# Patient Record
Sex: MALE | Race: BLACK OR AFRICAN AMERICAN | NOT HISPANIC OR LATINO | Employment: UNEMPLOYED | URBAN - METROPOLITAN AREA
[De-identification: names, ages, dates, MRNs, and addresses within clinical notes are randomized per-mention and may not be internally consistent; named-entity substitution may affect disease eponyms.]

---

## 2022-04-07 ENCOUNTER — HISTORICAL (OUTPATIENT)
Dept: ADMINISTRATIVE | Facility: HOSPITAL | Age: 77
End: 2022-04-07
Payer: MEDICAID

## 2022-04-24 VITALS — WEIGHT: 176.38 LBS | HEIGHT: 66 IN | BODY MASS INDEX: 28.34 KG/M2

## 2022-05-19 ENCOUNTER — OFFICE VISIT (OUTPATIENT)
Dept: OPHTHALMOLOGY | Facility: CLINIC | Age: 77
End: 2022-05-19
Payer: MEDICARE

## 2022-05-19 VITALS — BODY MASS INDEX: 28.34 KG/M2 | HEIGHT: 66 IN | WEIGHT: 176.38 LBS

## 2022-05-19 DIAGNOSIS — H25.89 TOTAL, MATURE AGE-RELATED CATARACT: ICD-10-CM

## 2022-05-19 DIAGNOSIS — H25.13 AGE-RELATED NUCLEAR CATARACT OF BOTH EYES: Primary | ICD-10-CM

## 2022-05-19 PROCEDURE — 76512 OPH US DX B-SCAN: CPT | Mod: PBBFAC,PO | Performed by: OPHTHALMOLOGY

## 2022-05-19 PROCEDURE — 99213 OFFICE O/P EST LOW 20 MIN: CPT | Mod: PBBFAC,PO | Performed by: STUDENT IN AN ORGANIZED HEALTH CARE EDUCATION/TRAINING PROGRAM

## 2022-05-19 PROCEDURE — 76512 OPH US DX B-SCAN: CPT | Mod: 50,PBBFAC,PO | Performed by: STUDENT IN AN ORGANIZED HEALTH CARE EDUCATION/TRAINING PROGRAM

## 2022-05-19 RX ORDER — PRAVASTATIN SODIUM 20 MG/1
20 TABLET ORAL DAILY
COMMUNITY
Start: 2022-05-04

## 2022-05-19 RX ORDER — CHLORPHENIRAMIN/PSEUDOEPHED/DM 1-15-5MG/5
17 LIQUID (ML) ORAL DAILY
Status: ON HOLD | COMMUNITY
Start: 2022-04-18 | End: 2023-09-28 | Stop reason: HOSPADM

## 2022-05-19 RX ORDER — CALCIUM CARBONATE 600 MG
1200 TABLET ORAL
COMMUNITY
Start: 2021-08-19

## 2022-05-19 RX ORDER — ONDANSETRON 4 MG/1
4 TABLET, FILM COATED ORAL EVERY 4 HOURS PRN
COMMUNITY
Start: 2022-03-09

## 2022-05-19 RX ORDER — FUROSEMIDE 20 MG/1
20 TABLET ORAL DAILY
COMMUNITY
Start: 2022-05-04

## 2022-05-19 RX ORDER — POLYETHYLENE GLYCOL 3350 17 G/17G
17 POWDER, FOR SOLUTION ORAL
COMMUNITY
Start: 2021-08-19

## 2022-05-19 RX ORDER — CHLORPROMAZINE HYDROCHLORIDE 200 MG/1
200 TABLET, FILM COATED ORAL
COMMUNITY
Start: 2021-08-19 | End: 2023-09-20 | Stop reason: SDUPTHER

## 2022-05-19 RX ORDER — CHLORPROMAZINE HYDROCHLORIDE 200 MG/1
200 TABLET, FILM COATED ORAL NIGHTLY
COMMUNITY
Start: 2022-05-04

## 2022-05-19 RX ORDER — LORAZEPAM 0.5 MG/1
0.5 TABLET ORAL 3 TIMES DAILY PRN
COMMUNITY
Start: 2022-05-07 | End: 2023-09-20 | Stop reason: ALTCHOICE

## 2022-05-19 RX ORDER — PROPRANOLOL HYDROCHLORIDE 10 MG/1
10 TABLET ORAL 2 TIMES DAILY
COMMUNITY
Start: 2022-05-04

## 2022-05-19 NOTE — PROGRESS NOTES
B scan OS (difficult to get full views due to poor cooperation): no RD or masses    Assessment/Plan:  1.) Visually significant cataracts OU  - OS (dense white)>>>>OD  - unable to determine va since patient is CI and nonverbal but able to see through cataract OD  - hx intellectual disability, schizophrenia, anxiety  - pt could not consent for self, [facility] [place] would need to consent for surgery  - B scan OS with flat retina  - Patient definitely with visually significant cataracts, but given his severe cognitive impairment, high threshold for surgery. He would not be able to follow post op instructions and would require around the clock monitoring, especially early on in post op period  - pt seen by PCP but clearance not mentioned in faxed note  - will need consent signed by [facility], pt cannot consent for self  - [facility] rep states that they do not think he could be watched closely enough to prevent rubbing his eye at his facility; pt cannot use drops on his own  - When time for CEIOL consider subtenon's steroid and intracameral antibiotics at the time of surgery. Would also need wounds sutured.  - given functional vision OD at this time, will defer surgery OS for now  - can still perform ADLs    6mo DFE and B scan

## 2022-08-26 ENCOUNTER — HOSPITAL ENCOUNTER (EMERGENCY)
Facility: HOSPITAL | Age: 77
Discharge: HOME | End: 2022-08-26
Attending: STUDENT IN AN ORGANIZED HEALTH CARE EDUCATION/TRAINING PROGRAM
Payer: MEDICARE

## 2022-08-26 VITALS
TEMPERATURE: 98 F | RESPIRATION RATE: 15 BRPM | DIASTOLIC BLOOD PRESSURE: 85 MMHG | OXYGEN SATURATION: 99 % | HEART RATE: 89 BPM | WEIGHT: 213.88 LBS | SYSTOLIC BLOOD PRESSURE: 130 MMHG

## 2022-08-26 DIAGNOSIS — U07.1 COVID-19: ICD-10-CM

## 2022-08-26 DIAGNOSIS — T14.90XA TRAUMA: ICD-10-CM
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
## 2022-08-26 LAB
ABS NEUT (OLG): 5.39 X10(3)/MCL (ref 2.1–9.2)
ALBUMIN SERPL-MCNC: 4 GM/DL (ref 3.4–4.8)
ALBUMIN/GLOB SERPL: 1.5 RATIO (ref 1.1–2)
ALP SERPL-CCNC: 49 UNIT/L (ref 40–150)
ALT SERPL-CCNC: 15 UNIT/L (ref 0–55)
APTT PPP: 30.1 SECONDS (ref 23.2–33.7)
AST SERPL-CCNC: 19 UNIT/L (ref 5–34)
BILIRUBIN DIRECT+TOT PNL SERPL-MCNC: 0.6 MG/DL
BNP BLD-MCNC: <10 PG/ML
BUN SERPL-MCNC: 23.1 MG/DL (ref 8.4–25.7)
CALCIUM SERPL-MCNC: 9 MG/DL (ref 8.8–10)
CHLORIDE SERPL-SCNC: 104 MMOL/L (ref 98–107)
CO2 SERPL-SCNC: 23 MMOL/L (ref 23–31)
CREAT SERPL-MCNC: 1.14 MG/DL (ref 0.73–1.18)
ERYTHROCYTE [DISTWIDTH] IN BLOOD BY AUTOMATED COUNT: 12.3 % (ref 11.5–17)
ETHANOL SERPL-MCNC: <10 MG/DL
FLUAV AG UPPER RESP QL IA.RAPID: NOT DETECTED
FLUBV AG UPPER RESP QL IA.RAPID: NOT DETECTED
GFR SERPLBLD CREATININE-BSD FMLA CKD-EPI: 50 MLS/MIN/1.73/M2
GLOBULIN SER-MCNC: 2.6 GM/DL (ref 2.4–3.5)
GLUCOSE SERPL-MCNC: 107 MG/DL (ref 82–115)
HCT VFR BLD AUTO: 36.4 %
HGB BLD-MCNC: 11.8 GM/DL
IMM GRANULOCYTES # BLD AUTO: 0.05 X10(3)/MCL (ref 0–0.04)
IMM GRANULOCYTES NFR BLD AUTO: 0.8 %
INR BLD: 0.96 (ref 0–1.3)
INSTRUMENT WBC (OLG): 7 X10(3)/MCL
LIPASE SERPL-CCNC: 36 U/L
LYMPHOCYTES NFR BLD MANUAL: 0.84 X10(3)/MCL
LYMPHOCYTES NFR BLD MANUAL: 12 %
MCH RBC QN AUTO: 29.2 PG (ref 27–31)
MCHC RBC AUTO-ENTMCNC: 32.4 MG/DL (ref 33–36)
MCV RBC AUTO: 90.1 FL (ref 80–94)
MONOCYTES NFR BLD MANUAL: 0.84 X10(3)/MCL (ref 0.1–1.3)
MONOCYTES NFR BLD MANUAL: 12 %
NEUTROPHILS NFR BLD MANUAL: 77 %
NRBC BLD AUTO-RTO: 0 %
PLATELET # BLD AUTO: 108 X10(3)/MCL (ref 130–400)
PLATELET # BLD EST: ABNORMAL 10*3/UL
PMV BLD AUTO: 9.5 FL (ref 7.4–10.4)
POTASSIUM SERPL-SCNC: 4 MMOL/L (ref 3.5–5.1)
PROT SERPL-MCNC: 6.6 GM/DL (ref 5.8–7.6)
PROTHROMBIN TIME: 12.7 SECONDS (ref 12.5–14.5)
RBC # BLD AUTO: 4.04 X10(6)/MCL
RBC MORPH BLD: NORMAL
SARS-COV-2 RNA RESP QL NAA+PROBE: DETECTED
SODIUM SERPL-SCNC: 135 MMOL/L (ref 136–145)
TROPONIN I SERPL-MCNC: <0.01 NG/ML (ref 0–0.04)
WBC # SPEC AUTO: 6.6 X10(3)/MCL (ref 4.5–11.5)

## 2022-08-26 PROCEDURE — 84484 ASSAY OF TROPONIN QUANT: CPT | Performed by: STUDENT IN AN ORGANIZED HEALTH CARE EDUCATION/TRAINING PROGRAM

## 2022-08-26 PROCEDURE — 36415 COLL VENOUS BLD VENIPUNCTURE: CPT | Performed by: STUDENT IN AN ORGANIZED HEALTH CARE EDUCATION/TRAINING PROGRAM

## 2022-08-26 PROCEDURE — 82077 ASSAY SPEC XCP UR&BREATH IA: CPT | Performed by: STUDENT IN AN ORGANIZED HEALTH CARE EDUCATION/TRAINING PROGRAM

## 2022-08-26 PROCEDURE — 85730 THROMBOPLASTIN TIME PARTIAL: CPT | Performed by: STUDENT IN AN ORGANIZED HEALTH CARE EDUCATION/TRAINING PROGRAM

## 2022-08-26 PROCEDURE — 99285 EMERGENCY DEPT VISIT HI MDM: CPT | Mod: 25

## 2022-08-26 PROCEDURE — 83690 ASSAY OF LIPASE: CPT | Performed by: STUDENT IN AN ORGANIZED HEALTH CARE EDUCATION/TRAINING PROGRAM

## 2022-08-26 PROCEDURE — 85025 COMPLETE CBC W/AUTO DIFF WBC: CPT | Performed by: STUDENT IN AN ORGANIZED HEALTH CARE EDUCATION/TRAINING PROGRAM

## 2022-08-26 PROCEDURE — 85007 BL SMEAR W/DIFF WBC COUNT: CPT | Performed by: STUDENT IN AN ORGANIZED HEALTH CARE EDUCATION/TRAINING PROGRAM

## 2022-08-26 PROCEDURE — 80053 COMPREHEN METABOLIC PANEL: CPT | Performed by: STUDENT IN AN ORGANIZED HEALTH CARE EDUCATION/TRAINING PROGRAM

## 2022-08-26 PROCEDURE — 85610 PROTHROMBIN TIME: CPT | Performed by: STUDENT IN AN ORGANIZED HEALTH CARE EDUCATION/TRAINING PROGRAM

## 2022-08-26 PROCEDURE — 87636 SARSCOV2 & INF A&B AMP PRB: CPT | Performed by: STUDENT IN AN ORGANIZED HEALTH CARE EDUCATION/TRAINING PROGRAM

## 2022-08-26 PROCEDURE — 83880 ASSAY OF NATRIURETIC PEPTIDE: CPT | Performed by: STUDENT IN AN ORGANIZED HEALTH CARE EDUCATION/TRAINING PROGRAM

## 2022-08-26 NOTE — DISCHARGE INSTRUCTIONS
Follow-up with primary care physician.      Continue closely monitor oxygenation.      Return to the emergency department for any shortness of breath, nausea, vomiting, difficulty breathing, fever, or any other symptoms.    Follow up with your primary care physician or provider in 1-2 days.      Return to the emergency department if any you have any worsening symptoms, new symptoms, or any other concerns.      Please signup for MyChart as noted below in your paperwork to review all labwork, imaging results, and any other incidental findings from today's visit.       Advice after your visit:  Your visit in the emergency department is NOT definitive care - please follow-up with your primary care doctor and/or specialist within 1-2 days.  Please return if you have any worsening in your condition or if you have any other concerns.    If you had radiology exams like an XRAY or CT in the emergency Department the interpreation on them may be preliminary - there may be less time sensitive findings on the reports please obtain these reports within 24 hours from the hospital or by using your out on your mobile phone to access records.  Bring these to your primary care doctor and/or specialist for further review of incidental findings.    Please review any LAB WORK from your visit today with your primary care physician.

## 2022-08-26 NOTE — ED NOTES
Bed: EMS 03  Expected date: 8/26/22  Expected time: 10:57 AM  Means of arrival:   Comments:  Level 2 trauma

## 2022-08-26 NOTE — ED PROVIDER NOTES
Encounter Date: 8/26/2022    SCRIBE #1 NOTE: I, Jhonatan Salazar, am scribing for, and in the presence of,  Kevin Greer MD. I have scribed the following portions of the note - Other sections scribed: HPI, ROS, PE.     History     Chief Complaint   Patient presents with    Trauma     Trauma Level 2. GCS-9.     78 y/o male presents to the ED from nursing home as a Level 2 trauma following falling out of his chair, hitting the floor, and losing consciousness. EMS says the fall occurred at 10:15. Per EMS, Pt woke up, tried to get up, and fell on the floor again. No seizure-like activity noted.     The history is provided by the EMS personnel. The history is limited by the condition of the patient. No  was used.   Fall  The accident occurred just prior to arrival (10:15). Fall occurred: from a chair. Trae Olivo fell from a height of 3 to 5 ft. Trae Olivo landed on A hard floor. The point of impact was the head (unknown). The pain is at a severity of 0/10.                     Review of patient's allergies indicates:  Not on File  History reviewed. No pertinent past medical history.  History reviewed. No pertinent surgical history.  History reviewed. No pertinent family history.     Review of Systems   Unable to perform ROS: Patient nonverbal     Physical Exam     Initial Vitals   BP Pulse Resp Temp SpO2   08/26/22 1120 08/26/22 1119 08/26/22 1119 08/26/22 1138 08/26/22 1119   134/77 93 19 98.1 °F (36.7 °C) 100 %      MAP       --                Physical Exam    Nursing note and vitals reviewed.  Constitutional: Trae Olivo appears well-developed and well-nourished.   HENT:   Head: Normocephalic and atraumatic.   No abrasions, contusions, lacerations to the scalp or face.  No superior inferior orbital ridge tenderness to palpation.  No zygomatic arch tenderness to palpation.  No epistaxis.  No CSF rhinorrhea.  No septal hematoma.  No intraoral injuries noted.  Normal external ear.  No  raccoon eyes.  No Santos sign.       Eyes: EOM are normal. Pupils are equal, round, and reactive to light.   Pupils 3mm and reactive to light   Neck:   Normal range of motion.  Cardiovascular:  Normal rate, regular rhythm, normal heart sounds and intact distal pulses.           No murmur heard.  Pulmonary/Chest: Breath sounds normal. No respiratory distress. Trae Olivo has no wheezes. Trae Olivo has no rales.   Abdominal: Abdomen is soft. Trae Olivo exhibits no distension. There is no abdominal tenderness. There is no rebound.   Genitourinary:    Genitourinary Comments: Stool present during rectal exam     Musculoskeletal:         General: No tenderness or edema. Normal range of motion.      Cervical back: Normal range of motion.      Comments: Moving all extremities     Neurological: Trae Olivo is alert. Trae Olivo has normal strength. No cranial nerve deficit. GCS score is 15.   GCS 11  Opens eyes to painful stimuli  Follows commands   Skin: Skin is warm and dry. Capillary refill takes less than 2 seconds. No rash noted. No erythema.   Psychiatric: Trae Olivo has a normal mood and affect.       ED Course   Procedures  Labs Reviewed   COMPREHENSIVE METABOLIC PANEL - Abnormal; Notable for the following components:       Result Value    Sodium Level 135 (*)     All other components within normal limits   COVID/FLU A&B PCR - Abnormal; Notable for the following components:    SARS-CoV-2 PCR Detected (*)     All other components within normal limits   CBC WITH DIFFERENTIAL - Abnormal; Notable for the following components:    MCHC 32.4 (*)     Platelet 108 (*)     IG# 0.05 (*)     All other components within normal limits   MANUAL DIFFERENTIAL - Abnormal; Notable for the following components:    Platelet Est Decreased (*)     All other components within normal limits   B-TYPE NATRIURETIC PEPTIDE - Normal   APTT - Normal   TROPONIN I - Normal   LIPASE - Normal   PROTIME-INR - Normal    ALCOHOL,MEDICAL (ETHANOL) - Normal   CBC W/ AUTO DIFFERENTIAL    Narrative:     The following orders were created for panel order CBC auto differential.  Procedure                               Abnormality         Status                     ---------                               -----------         ------                     CBC with Differential[[id number]]        Abnormal            Final result               Manual Differential[[id number]]          Abnormal            Final result                 Please view results for these tests on the individual orders.          Imaging Results              X-Ray Pelvis Routine AP (Final result)  Result time 08/26/22 16:44:06      Final result by Emil Miller MD (08/26/22 16:44:06)                   Impression:      No acute osseous process appreciated.      Electronically signed by: Emil Miller  Date:    08/26/2022  Time:    16:44               Narrative:    EXAMINATION:  XR PELVIS ROUTINE AP    CLINICAL HISTORY:  Injury, unspecified, initial encounter    TECHNIQUE:  AP view of the pelvis.    COMPARISON:  None.    FINDINGS:  No acute fracture identified.  Hips and symphysis are aligned with mild to moderate degenerative changes at the hips.                                       X-Ray Chest AP Portable (Final result)  Result time 08/26/22 16:42:39      Final result by Emil Miller MD (08/26/22 16:42:39)                   Impression:      No acute pulmonary process identified.      Electronically signed by: Emil Miller  Date:    08/26/2022  Time:    16:42               Narrative:    EXAMINATION:  XR CHEST AP PORTABLE    CLINICAL HISTORY:  trauma;    TECHNIQUE:  Frontal view(s) of the chest.    COMPARISON:  No relevant comparison studies available at the time of dictation.    FINDINGS:  Normal cardiac silhouette.  The lungs are mildly hypoinflated.  No consolidation identified.  No significant pleural effusion or discernible pneumothorax.                                        CT Cervical Spine Without Contrast (Final result)  Result time 08/26/22 12:17:46      Final result by Linda Pickens MD (08/26/22 12:17:46)                   Impression:      1. No appreciable acute osseous abnormality  2. Cervical spondylosis  3. Probable nodular enlargement of the thyroid, partially imaged.  This can be evaluated with outpatient sonogram.      Electronically signed by: Linda Pickens  Date:    08/26/2022  Time:    12:17               Narrative:    EXAMINATION:  CT CERVICAL SPINE WITHOUT CONTRAST    CLINICAL HISTORY:  Neck trauma (Age >= 65y);    TECHNIQUE:  Low dose helical acquired images with axial, sagittal and coronal reformations though the cervical spine.  Contrast was not administered.    All CT scans at this location are performed using dose optimization techniques as appropriate to a performed exam including the following automated exposure control, adjustment of the mA and/or kV according to patient size and/or use of iterative reconstruction technique    DLP: 1351 mGycm    COMPARISON:  No relevant prior available for comparison.    FINDINGS:  BONES: No fracture. Normal alignment. The dens is intact, the lateral masses of C1 are normally aligned, and the atlantodental interval is degeneratively narrowed.    DISCS: Multilevel disc space narrowing with anterior and posterior disc osteophytes and uncovertebral hypertrophy.    SPINAL CANAL: No osseous narrowing of the spinal canal.    SOFT TISSUES: Partially imaged soft tissue nodule at the base of the neck on the left, likely related to the thyroid.    LUNG APICES: Clear                                       CT Head Without Contrast (Final result)  Result time 08/26/22 12:01:16      Final result by Tyrese Yanez MD (08/26/22 12:01:16)                   Impression:      No acute intracranial findings.      Electronically signed by: Tyrese Yanez  Date:    08/26/2022  Time:    12:01               Narrative:     EXAMINATION:  CT HEAD WITHOUT CONTRAST    CLINICAL HISTORY:  Neuro deficit, acute, stroke suspected;Mental status change, unknown cause;    TECHNIQUE:  CT imaging of the head performed from the skull base to the vertex without intravenous contrast. DLP 1351 mGycm. Automatic exposure control, adjustment of mA/kV or iterative reconstruction technique was used to reduce radiation.    COMPARISON:  None Available.    FINDINGS:  There is no acute cortical infarct, hemorrhage or mass lesion.  The ventricles are normal in size.    Visualized paranasal sinuses and mastoid air cells are clear.  There is debris right external auditory canal.                                       Medications - No data to display  Medical Decision Making:   Clinical Tests:   Lab Tests: Ordered and Reviewed  Radiological Study: Ordered and Reviewed  ED Management:  Patient 77-year-old male presents to the emergency department for follow-up.  See HPI.  See physical exam.  Patient with baseline aphasia, normal GCS of 14 presenting from nursing home.  Labs and imaging as noted.  On reassessment patient back to his neurologic baseline.  No acute distress.  Vital signs stable.  No evidence of acute traumatic injuries.  Incidental covid 19 positive.     Discussed all results.  Discussed need for follow-up.  Discussed return precautions.  Answered all questions at this time.  Hemodynamically stable for continued outpatient management with strict return precautions.             Scribe Attestation:   Scribe #1: I performed the above scribed service and the documentation accurately describes the services I performed. I attest to the accuracy of the note.    Attending Attestation:           Physician Attestation for Scribe:  Physician Attestation Statement for Scribe #1: I, Kevin Greer MD, reviewed documentation, as scribed by Jhonatan Salazar in my presence, and it is both accurate and complete.                    Clinical Impression:   Final  diagnoses:  [T14.90XA] Trauma  [W19.XXXA] Fall, initial encounter (Primary)  [U07.1] COVID-19        ED Disposition Condition    Discharge Stable          ED Prescriptions    None       Follow-up Information       Follow up With Specialties Details Why Contact Info    Madhu Rai MD Family Medicine Schedule an appointment as soon as possible for a visit in 1 day  [address] [facility] [address]  [phone]      Your primary care physician.                 Kevin Greer MD  09/07/22 1630       Kevin Greer MD  09/28/22 3525

## 2022-12-15 ENCOUNTER — OFFICE VISIT (OUTPATIENT)
Dept: OPHTHALMOLOGY | Facility: CLINIC | Age: 77
End: 2022-12-15
Attending: STUDENT IN AN ORGANIZED HEALTH CARE EDUCATION/TRAINING PROGRAM
Payer: MEDICARE

## 2022-12-15 VITALS — WEIGHT: 213 LBS | BODY MASS INDEX: 34.23 KG/M2 | HEIGHT: 66 IN

## 2022-12-15 DIAGNOSIS — H25.13 AGE-RELATED NUCLEAR CATARACT OF BOTH EYES: Primary | ICD-10-CM

## 2022-12-15 PROCEDURE — 76512 OPH US DX B-SCAN: CPT | Mod: PBBFAC,PO | Performed by: STUDENT IN AN ORGANIZED HEALTH CARE EDUCATION/TRAINING PROGRAM

## 2022-12-15 PROCEDURE — 76512 OPH US DX B-SCAN: CPT | Mod: PBBFAC,PO,RT | Performed by: OPHTHALMOLOGY

## 2022-12-15 PROCEDURE — 99213 OFFICE O/P EST LOW 20 MIN: CPT | Mod: PBBFAC,PO | Performed by: OPHTHALMOLOGY

## 2022-12-15 RX ORDER — TROPICAMIDE 10 MG/ML
1 SOLUTION/ DROPS OPHTHALMIC ONCE
Status: COMPLETED | OUTPATIENT
Start: 2022-12-15 | End: 2022-12-15

## 2022-12-15 RX ORDER — PHENYLEPHRINE HYDROCHLORIDE 25 MG/ML
1 SOLUTION/ DROPS OPHTHALMIC ONCE
Status: COMPLETED | OUTPATIENT
Start: 2022-12-15 | End: 2022-12-15

## 2022-12-15 RX ADMIN — PHENYLEPHRINE HYDROCHLORIDE 1 DROP: 25 SOLUTION/ DROPS OPHTHALMIC at 03:12

## 2022-12-15 RX ADMIN — TROPICAMIDE 1 DROP: 10 SOLUTION/ DROPS OPHTHALMIC at 03:12

## 2022-12-15 NOTE — PROGRESS NOTES
HPI     Cataract     Additional comments: 6 mo DFE, B scan          Last edited by Kacie Fernandes RN on 12/15/2022  2:25 PM.          12/15/2022  Assessment /Plan     For exam results, see Encounter Report.    Age-related nuclear cataract of both eyes    Other orders  -     tropicamide 1% ophthalmic solution 1 drop  -     phenylephrine HCL 2.5% ophthalmic solution 1 drop      1.) Visually significant cataracts OU  - OS (dense white)>>>>OD   - unable to determine va since patient is CI and nonverbal but able to see through cataract OD  - hx intellectual disability, schizophrenia, anxiety  - pt could not consent for self, [facility] [place] would need to consent for surgery  - B scan OU with flat retina  - Patient definitely with visually significant cataracts, but given his severe cognitive impairment, high threshold for surgery. He would not be able to follow post op instructions and would require around the clock monitoring, especially early on in post op period  - pt seen by PCP but clearance not mentioned in faxed note  - will need consent signed by [facility], pt cannot consent for self  - [facility] rep states that patient is watched 24/7 but cannot guarantee not rubbing eyes/tolerating eye shied; pt would have to receive drops from nursing staff    - When time for CEIOL consider subtenon's steroid and intracameral antibiotics at the time of surgery. Would also need wounds sutured.  - given functional vision OD at this time, will defer surgery OS for now  - can still perform ADLs    9mo DFE and B scan

## 2023-09-15 ENCOUNTER — OFFICE VISIT (OUTPATIENT)
Dept: OPHTHALMOLOGY | Facility: CLINIC | Age: 78
End: 2023-09-15
Payer: MEDICARE

## 2023-09-15 VITALS — BODY MASS INDEX: 34.22 KG/M2 | WEIGHT: 212.94 LBS | HEIGHT: 66 IN

## 2023-09-15 DIAGNOSIS — H25.89 TOTAL, MATURE AGE-RELATED CATARACT: Primary | ICD-10-CM

## 2023-09-15 DIAGNOSIS — H25.13 AGE-RELATED NUCLEAR CATARACT OF BOTH EYES: ICD-10-CM

## 2023-09-15 PROCEDURE — 99213 OFFICE O/P EST LOW 20 MIN: CPT | Mod: PBBFAC,PO

## 2023-09-15 RX ORDER — LORAZEPAM 1 MG/1
1 TABLET ORAL DAILY PRN
Status: ON HOLD | COMMUNITY
Start: 2023-07-24 | End: 2023-09-28 | Stop reason: HOSPADM

## 2023-09-15 NOTE — PROGRESS NOTES
HPI      9mo DFE and B scan  Care giver states that patient is having more trouble with everyday tasks   due to vision decrease  Last edited by Tracy Heredia MA on 9/15/2023  9:08 AM.            Assessment /Plan     1.) Visually significant cataracts OU  - OS (dense white)>>>>OD   - unable to determine va since patient is CI and nonverbal but able to see through cataract OD  - hx intellectual disability, schizophrenia, anxiety  - pt could not consent for self, [facility] [place] would need to consent for surgery  - B scan OU with flat retina  - Patient definitely with visually significant cataracts, but given his severe cognitive impairment, high threshold for surgery. He would not be able to follow post op instructions and would require around the clock monitoring, especially early on in post op period  - pt seen by PCP but clearance not mentioned in faxed note  - will need consent signed by [facility], pt cannot consent for self  - [facility] rep states that patient is watched 24/7 but cannot guarantee not rubbing eyes/tolerating eye shied; pt would have to receive drops from nursing staff    - When time for CEIOL consider subtenon's steroid and intracameral antibiotics at the time of surgery. Would also need wounds sutured.  - given functional vision OD at this time, will defer surgery OS for now  - Care giver states that patient is having more trouble with everyday tasks due to vision decrease.   - 9/15/23: PT with increased dificulty with Adls. [facility] believes it is time for him to have cataract surgery. Will Start OS on 9/28/23. Will call/Fax [facility] for consent. B scan OS today wnl. Unable to do formal calculations given White cataract and PT ability to participate in exam. Have Pato of left eye and Axial length of right eye.     RTC POD 1 9/29/23    There are no diagnoses linked to this encounter.  Wisam Julian MD  [employer] [facility] PGY-2

## 2023-09-18 RX ORDER — PREDNISOLONE ACETATE 10 MG/ML
1 SUSPENSION/ DROPS OPHTHALMIC
Status: CANCELLED | OUTPATIENT
Start: 2023-09-18

## 2023-09-18 RX ORDER — CYCLOPENTOLAT/TROPIC/PHENYLEPH 1%-1%-2.5%
1 DROPS (EA) OPHTHALMIC (EYE)
Status: CANCELLED | OUTPATIENT
Start: 2023-09-18

## 2023-09-18 RX ORDER — SODIUM CHLORIDE 0.9 % (FLUSH) 0.9 %
10 SYRINGE (ML) INJECTION
Status: ACTIVE | OUTPATIENT
Start: 2023-09-18

## 2023-09-18 RX ORDER — MOXIFLOXACIN 5 MG/ML
1 SOLUTION/ DROPS OPHTHALMIC
Status: CANCELLED | OUTPATIENT
Start: 2023-09-18

## 2023-09-18 RX ORDER — FLURBIPROFEN SODIUM 0.3 MG/ML
1 SOLUTION/ DROPS OPHTHALMIC
Status: CANCELLED | OUTPATIENT
Start: 2023-09-18

## 2023-09-18 RX ORDER — TETRACAINE HYDROCHLORIDE 5 MG/ML
1 SOLUTION OPHTHALMIC
Status: CANCELLED | OUTPATIENT
Start: 2023-09-18

## 2023-09-18 NOTE — H&P (VIEW-ONLY)
History    Chief complaint:  Painless progressive vision loss    Present Ilness/Diagnosis: Nuclear Sclerotic Cataract, Left Eye    REVIEW OF SYSTEMS:   Review of Systems -   General ROS: negative  Psychological ROS: negative  Ophthalmic ROS: positive for - blurry vision  ENT ROS: negative  Allergy and Immunology ROS: negative  Hematological and Lymphatic ROS: negative  Endocrine ROS: negative  Respiratory ROS: no cough, shortness of breath, or wheezing  Cardiovascular ROS: no chest pain or dyspnea on exertion  Gastrointestinal ROS: no abdominal pain, change in bowel habits, or black or bloody stools  Genito-Urinary ROS: no dysuria, trouble voiding, or hematuria  Musculoskeletal ROS: negative  Neurological ROS: no TIA or stroke symptoms  Dermatological ROS: negative    Active Ambulatory Problems     Diagnosis Date Noted    Total, mature age-related cataract 05/19/2022     Resolved Ambulatory Problems     Diagnosis Date Noted    No Resolved Ambulatory Problems     Past Medical History:   Diagnosis Date    Cataract        History reviewed. No pertinent family history.    Review of patient's allergies indicates:  No Known Allergies    Current Outpatient Medications on File Prior to Visit   Medication Sig Dispense Refill    calcium carbonate (OS-KESHAWN) 600 mg calcium (1,500 mg) Tab Take 1,200 mg by mouth.      chlorproMAZINE (THORAZINE) 200 MG tablet Take 200 mg by mouth.      chlorproMAZINE (THORAZINE) 200 MG tablet Take by mouth.      CLEARLAX 17 gram/dose powder Take 17 g by mouth once daily.      furosemide (LASIX) 20 MG tablet Take 20 mg by mouth once daily.      LORazepam (ATIVAN) 0.5 MG tablet Take 0.5 mg by mouth 3 (three) times daily as needed.      LORazepam (ATIVAN) 1 MG tablet Take 1 mg by mouth daily as needed.      ondansetron (ZOFRAN) 4 MG tablet Take 4 mg by mouth every 4 (four) hours as needed.      polyethylene glycol (GLYCOLAX) 17 gram/dose powder Take 17 g by mouth.      pravastatin (PRAVACHOL) 20 MG  tablet Take 20 mg by mouth daily as needed.      propranoloL (INDERAL) 10 MG tablet Take 10 mg by mouth 2 (two) times daily.       No current facility-administered medications on file prior to visit.       Physical Exam    BP: Vital signs stable  General: No apparent distress  HEENT: nuclear sclerotic cataract  Lungs: adequate respiratory effort  Heart: + pulses, regular rate  Abdomen: soft  Rectal/pelvic: deferred    Impression: Visually Significant Cataract, Left Eye    Plan: Phacoemulsification with implantation of Intraocular lens  No need to hold blood thinners or ASA.

## 2023-09-20 ENCOUNTER — ANESTHESIA EVENT (OUTPATIENT)
Dept: SURGERY | Facility: HOSPITAL | Age: 78
End: 2023-09-20
Payer: MEDICARE

## 2023-09-20 RX ORDER — LORAZEPAM 0.5 MG/1
0.5 TABLET ORAL 3 TIMES DAILY
COMMUNITY

## 2023-09-20 NOTE — ANESTHESIA PREPROCEDURE EVALUATION
09/20/2023  Trae Olivo is a 78 y.o., male.    COVID STATUS: MODERNA X 3 (LASTLY 11/18/21); 8/26/22 COVID +  BETA-BLOCKER: PROPRANOLOL      .james  PROBLEM LIST:  -  LEFT EYE CATARACT (ALSO on RIGHT)  -  OBESITY CLASS I  -  TACHYCARDIA, HTN - on PROPRANOLOL  -  HLD  -  SCHIZOPHRENIA, ANXIETY  -  SEVERE MENTAL RETARDATION w/PROFOUND INTELLECTUAL DISABILITY  -  ORGANIC BRAIN SYNDROME  -  AGGRESSIVE, BIZZARE BEHAVIOR  -  BLE EDEMA - on LASIX  -  ER 8/26/22 w/FALL from CHAIR, +LOC, COVID +  -  RESIDENT of [facility]    AM Rx DOS: THORAZINE, PROPRANOLOL, ATIVAN, ZOFRAN PRN    ORDERS -   SURGEON: 1/15/21 EKG; 8/26/22 CBC, CMP, CXR;  ANESTHESIA: NONE    Pre-op Assessment    I have reviewed the Patient Summary Reports.     I have reviewed the Nursing Notes. I have reviewed the NPO Status.   I have reviewed the Medications.     Review of Systems  Anesthesia Hx:  No problems with previous Anesthesia  History of prior surgery of interest to airway management or planning: Denies Family Hx of Anesthesia complications.   Denies Personal Hx of Anesthesia complications.   Hematology/Oncology:  Hematology Normal   Oncology Normal     EENT/Dental:EENT/Dental Normal   Cardiovascular:  Cardiovascular Normal     Pulmonary:  Pulmonary Normal    Renal/:  Renal/ Normal     Hepatic/GI:  Hepatic/GI Normal    Musculoskeletal:  Musculoskeletal Normal    Neurological:  Neurology Normal    Endocrine:  Endocrine Normal    Dermatological:  Skin Normal    Psych:  Psychiatric Normal           Physical Exam  General: Well nourished, Cooperative, Alert and Oriented    Airway:  Mallampati: I / I  Mouth Opening: Normal  TM Distance: Normal      Dental:  Dentures        Anesthesia Plan  Type of Anesthesia, risks & benefits discussed:    Anesthesia Type: Gen Supraglottic Airway  Intra-op Monitoring Plan: Standard ASA Monitors  Induction:   IV  Informed Consent: Informed consent signed with the Patient and all parties understand the risks and agree with anesthesia plan.  All questions answered. Patient consented to blood products? No  ASA Score: 3  Day of Surgery Review of History & Physical: H&P Update referred to the surgeon/provider.    Ready For Surgery From Anesthesia Perspective.     .    Lab Results   Component Value Date    WBC 6.6 08/26/2022    WBC 7 08/26/2022    HGB 11.8 08/26/2022    HCT 36.4 08/26/2022    MCV 90.1 08/26/2022     (L) 08/26/2022     CMP  Sodium Level   Date Value Ref Range Status   08/26/2022 135 (L) 136 - 145 mmol/L Final     Potassium Level   Date Value Ref Range Status   08/26/2022 4.0 3.5 - 5.1 mmol/L Final     Carbon Dioxide   Date Value Ref Range Status   08/26/2022 23 23 - 31 mmol/L Final     Blood Urea Nitrogen   Date Value Ref Range Status   08/26/2022 23.1 8.4 - 25.7 mg/dL Final     Creatinine   Date Value Ref Range Status   08/26/2022 1.14 0.73 - 1.18 mg/dL Final     Calcium Level Total   Date Value Ref Range Status   08/26/2022 9.0 8.8 - 10.0 mg/dL Final     Albumin Level   Date Value Ref Range Status   08/26/2022 4.0 3.4 - 4.8 gm/dL Final     Bilirubin Total   Date Value Ref Range Status   08/26/2022 0.6 <=1.5 mg/dL Final     Alkaline Phosphatase   Date Value Ref Range Status   08/26/2022 49 40 - 150 unit/L Final     Aspartate Aminotransferase   Date Value Ref Range Status   08/26/2022 19 5 - 34 unit/L Final     Alanine Aminotransferase   Date Value Ref Range Status   08/26/2022 15 0 - 55 unit/L Final     eGFR   Date Value Ref Range Status   08/26/2022 50 mls/min/1.73/m2 Final         8/6/22 CXR  FINDINGS:  Normal cardiac silhouette.  The lungs are mildly hypoinflated.  No consolidation identified.  No significant pleural effusion or discernible pneumothorax.     Impression:     No acute pulmonary process identified.

## 2023-09-27 ENCOUNTER — TELEPHONE (OUTPATIENT)
Dept: OPHTHALMOLOGY | Facility: CLINIC | Age: 78
End: 2023-09-27
Payer: MEDICARE

## 2023-09-27 NOTE — TELEPHONE ENCOUNTER
09/27/2023  8:59 AM  Spoke to patient's caregiver and confirmed post op appointment time for upcoming cataract surgery on 9/28/23. She understood all instructions.

## 2023-09-27 NOTE — DISCHARGE INSTRUCTIONS
CATARACT/ EYE    · Keep follow up appointment tomorrow at at the [facility]. You will begin drops tonight at bedtime - one drop of each bottle and replace patch over eye .  Do not forget to bring eye drops with you to clinic appt. In the am.    · No bending, lifting, stooping or straining until cleared by MD.    · Keep patch on until follow up appointment and while asleep at home to protect your eye.    · May take Tylenol or Ibuprofen for pain or discomfort if no allergies or contraindications.    · Notify MD if you experience:    · Pain that is unrelieved by over the counter medicines    · if you feel increased pressure in your eye or sharp pain in the eye    · you have excessive, colored, or thick drainage coming from eye    · you see curtain-like darkening in the eye, flashes of light, or other sudden vision changes    · if you have any nausea or vomiting    · fever above 100.4F    · The clinics number is [phone]. If it is after business hours or emergency call [phone].

## 2023-09-28 ENCOUNTER — ANESTHESIA (OUTPATIENT)
Dept: SURGERY | Facility: HOSPITAL | Age: 78
End: 2023-09-28
Payer: MEDICARE

## 2023-09-28 ENCOUNTER — HOSPITAL ENCOUNTER (OUTPATIENT)
Facility: HOSPITAL | Age: 78
Discharge: HOME | End: 2023-09-28
Attending: OPHTHALMOLOGY | Admitting: OPHTHALMOLOGY
Payer: MEDICARE

## 2023-09-28 VITALS
WEIGHT: 212 LBS | DIASTOLIC BLOOD PRESSURE: 86 MMHG | TEMPERATURE: 98 F | SYSTOLIC BLOOD PRESSURE: 134 MMHG | BODY MASS INDEX: 34.22 KG/M2 | HEART RATE: 84 BPM | OXYGEN SATURATION: 96 % | RESPIRATION RATE: 17 BRPM

## 2023-09-28 DIAGNOSIS — H25.89 TOTAL, MATURE AGE-RELATED CATARACT: ICD-10-CM

## 2023-09-28 PROCEDURE — 27201423 OPTIME MED/SURG SUP & DEVICES STERILE SUPPLY: Performed by: OPHTHALMOLOGY

## 2023-09-28 PROCEDURE — 71000033 HC RECOVERY, INTIAL HOUR: Performed by: OPHTHALMOLOGY

## 2023-09-28 PROCEDURE — D9220A PRA ANESTHESIA: ICD-10-PCS | Mod: CRNA,,, | Performed by: NURSE ANESTHETIST, CERTIFIED REGISTERED

## 2023-09-28 PROCEDURE — 25000003 PHARM REV CODE 250

## 2023-09-28 PROCEDURE — 71000015 HC POSTOP RECOV 1ST HR: Performed by: OPHTHALMOLOGY

## 2023-09-28 PROCEDURE — D9220A PRA ANESTHESIA: Mod: ANES,,, | Performed by: SPECIALIST

## 2023-09-28 PROCEDURE — 25000003 PHARM REV CODE 250: Performed by: OPHTHALMOLOGY

## 2023-09-28 PROCEDURE — V2632 POST CHMBR INTRAOCULAR LENS: HCPCS | Performed by: OPHTHALMOLOGY

## 2023-09-28 PROCEDURE — 63600175 PHARM REV CODE 636 W HCPCS: Performed by: OPHTHALMOLOGY

## 2023-09-28 PROCEDURE — 36000706: Performed by: OPHTHALMOLOGY

## 2023-09-28 PROCEDURE — 63600175 PHARM REV CODE 636 W HCPCS: Performed by: NURSE ANESTHETIST, CERTIFIED REGISTERED

## 2023-09-28 PROCEDURE — 63600175 PHARM REV CODE 636 W HCPCS: Performed by: SPECIALIST

## 2023-09-28 PROCEDURE — 37000009 HC ANESTHESIA EA ADD 15 MINS: Performed by: OPHTHALMOLOGY

## 2023-09-28 PROCEDURE — D9220A PRA ANESTHESIA: ICD-10-PCS | Mod: ANES,,, | Performed by: SPECIALIST

## 2023-09-28 PROCEDURE — 25000003 PHARM REV CODE 250: Performed by: NURSE ANESTHETIST, CERTIFIED REGISTERED

## 2023-09-28 PROCEDURE — 37000008 HC ANESTHESIA 1ST 15 MINUTES: Performed by: OPHTHALMOLOGY

## 2023-09-28 PROCEDURE — D9220A PRA ANESTHESIA: Mod: CRNA,,, | Performed by: NURSE ANESTHETIST, CERTIFIED REGISTERED

## 2023-09-28 PROCEDURE — 36000707: Performed by: OPHTHALMOLOGY

## 2023-09-28 PROCEDURE — 25000003 PHARM REV CODE 250: Performed by: STUDENT IN AN ORGANIZED HEALTH CARE EDUCATION/TRAINING PROGRAM

## 2023-09-28 DEVICE — ACRYSOF(R) SOFT ACRYLIC MULTIPIECE STERILEPCL(IOL/PC), 13.0MM LENGTH, 6.0MM ANTERIORASYMMETRIC BICONVEX OPTIC, MONOFLEX(TM*)10-DEGREE HAPTICS.     *REG. U.S. PAT. & TM OFF.
Type: IMPLANTABLE DEVICE | Site: EYE | Status: FUNCTIONAL
Brand: ACRYSOF®MONOFLEX®

## 2023-09-28 RX ORDER — LIDOCAINE HYDROCHLORIDE 10 MG/ML
1 INJECTION, SOLUTION EPIDURAL; INFILTRATION; INTRACAUDAL; PERINEURAL ONCE
Status: ACTIVE | OUTPATIENT
Start: 2023-09-28

## 2023-09-28 RX ORDER — SODIUM CHLORIDE, SODIUM LACTATE, POTASSIUM CHLORIDE, CALCIUM CHLORIDE 600; 310; 30; 20 MG/100ML; MG/100ML; MG/100ML; MG/100ML
INJECTION, SOLUTION INTRAVENOUS CONTINUOUS
Status: DISCONTINUED | OUTPATIENT
Start: 2023-09-28 | End: 2023-09-28 | Stop reason: HOSPADM

## 2023-09-28 RX ORDER — FENTANYL CITRATE 50 UG/ML
INJECTION, SOLUTION INTRAMUSCULAR; INTRAVENOUS
Status: DISCONTINUED | OUTPATIENT
Start: 2023-09-28 | End: 2023-09-28

## 2023-09-28 RX ORDER — MOXIFLOXACIN 5 MG/ML
SOLUTION/ DROPS OPHTHALMIC
Status: DISCONTINUED | OUTPATIENT
Start: 2023-09-28 | End: 2023-09-28 | Stop reason: HOSPADM

## 2023-09-28 RX ORDER — PREDNISOLONE ACETATE 10 MG/ML
SUSPENSION/ DROPS OPHTHALMIC
Status: DISCONTINUED | OUTPATIENT
Start: 2023-09-28 | End: 2023-09-28 | Stop reason: HOSPADM

## 2023-09-28 RX ORDER — VASOPRESSIN 20 [USP'U]/ML
INJECTION, SOLUTION INTRAMUSCULAR; SUBCUTANEOUS
Status: DISCONTINUED | OUTPATIENT
Start: 2023-09-28 | End: 2023-09-28

## 2023-09-28 RX ORDER — LIDOCAINE HYDROCHLORIDE 20 MG/ML
INJECTION, SOLUTION EPIDURAL; INFILTRATION; INTRACAUDAL; PERINEURAL
Status: DISCONTINUED | OUTPATIENT
Start: 2023-09-28 | End: 2023-09-28

## 2023-09-28 RX ORDER — DEXMEDETOMIDINE HYDROCHLORIDE 100 UG/ML
INJECTION, SOLUTION INTRAVENOUS
Status: DISCONTINUED | OUTPATIENT
Start: 2023-09-28 | End: 2023-09-28

## 2023-09-28 RX ORDER — PHENYLEPHRINE HYDROCHLORIDE 10 MG/ML
INJECTION INTRAVENOUS
Status: DISCONTINUED | OUTPATIENT
Start: 2023-09-28 | End: 2023-09-28

## 2023-09-28 RX ORDER — FLURBIPROFEN SODIUM 0.3 MG/ML
SOLUTION/ DROPS OPHTHALMIC
Status: DISCONTINUED | OUTPATIENT
Start: 2023-09-28 | End: 2023-09-28 | Stop reason: HOSPADM

## 2023-09-28 RX ORDER — CYCLOPENTOLAT/TROPIC/PHENYLEPH 1%-1%-2.5%
1 DROPS (EA) OPHTHALMIC (EYE)
Status: DISCONTINUED | OUTPATIENT
Start: 2023-09-28 | End: 2023-09-28 | Stop reason: HOSPADM

## 2023-09-28 RX ORDER — DEXAMETHASONE SODIUM PHOSPHATE 4 MG/ML
INJECTION, SOLUTION INTRA-ARTICULAR; INTRALESIONAL; INTRAMUSCULAR; INTRAVENOUS; SOFT TISSUE
Status: DISCONTINUED | OUTPATIENT
Start: 2023-09-28 | End: 2023-09-28

## 2023-09-28 RX ORDER — KETOROLAC TROMETHAMINE 30 MG/ML
INJECTION, SOLUTION INTRAMUSCULAR; INTRAVENOUS
Status: DISCONTINUED | OUTPATIENT
Start: 2023-09-28 | End: 2023-09-28

## 2023-09-28 RX ORDER — EPINEPHRINE CONVENIENCE KIT 1 MG/ML(1)
KIT INTRAMUSCULAR; SUBCUTANEOUS
Status: DISCONTINUED | OUTPATIENT
Start: 2023-09-28 | End: 2023-09-28 | Stop reason: HOSPADM

## 2023-09-28 RX ORDER — ONDANSETRON 2 MG/ML
INJECTION INTRAMUSCULAR; INTRAVENOUS
Status: DISCONTINUED | OUTPATIENT
Start: 2023-09-28 | End: 2023-09-28

## 2023-09-28 RX ORDER — PROPOFOL 10 MG/ML
VIAL (ML) INTRAVENOUS
Status: DISCONTINUED | OUTPATIENT
Start: 2023-09-28 | End: 2023-09-28

## 2023-09-28 RX ORDER — TETRACAINE HYDROCHLORIDE 5 MG/ML
1 SOLUTION OPHTHALMIC
Status: DISCONTINUED | OUTPATIENT
Start: 2023-09-28 | End: 2023-09-28 | Stop reason: HOSPADM

## 2023-09-28 RX ADMIN — PHENYLEPHRINE HYDROCHLORIDE 200 MCG: 10 INJECTION INTRAVENOUS at 07:09

## 2023-09-28 RX ADMIN — ONDANSETRON 4 MG: 2 INJECTION INTRAMUSCULAR; INTRAVENOUS at 07:09

## 2023-09-28 RX ADMIN — LIDOCAINE HYDROCHLORIDE 100 MG: 20 INJECTION, SOLUTION EPIDURAL; INFILTRATION; INTRACAUDAL; PERINEURAL at 07:09

## 2023-09-28 RX ADMIN — KETOROLAC TROMETHAMINE 30 MG: 30 INJECTION, SOLUTION INTRAMUSCULAR at 07:09

## 2023-09-28 RX ADMIN — PROPOFOL 120 MG: 10 INJECTION, EMULSION INTRAVENOUS at 07:09

## 2023-09-28 RX ADMIN — FENTANYL CITRATE 25 MCG: 50 INJECTION INTRAMUSCULAR; INTRAVENOUS at 07:09

## 2023-09-28 RX ADMIN — Medication 1 DROP: at 05:09

## 2023-09-28 RX ADMIN — DEXAMETHASONE SODIUM PHOSPHATE 8 MG: 4 INJECTION, SOLUTION INTRA-ARTICULAR; INTRALESIONAL; INTRAMUSCULAR; INTRAVENOUS; SOFT TISSUE at 07:09

## 2023-09-28 RX ADMIN — SODIUM CHLORIDE, POTASSIUM CHLORIDE, SODIUM LACTATE AND CALCIUM CHLORIDE: 600; 310; 30; 20 INJECTION, SOLUTION INTRAVENOUS at 06:09

## 2023-09-28 RX ADMIN — DEXMEDETOMIDINE 10 MCG: 200 INJECTION, SOLUTION INTRAVENOUS at 07:09

## 2023-09-28 RX ADMIN — TETRACAINE HYDROCHLORIDE 1 DROP: 5 SOLUTION OPHTHALMIC at 05:09

## 2023-09-28 RX ADMIN — VASOPRESSIN 1 UNITS: 20 INJECTION INTRAVENOUS at 07:09

## 2023-09-28 NOTE — ANESTHESIA PROCEDURE NOTES
Intubation    Date/Time: 9/28/2023 7:02 AM    Performed by: Dylan Onofre CRNA  Authorized by: Katiuska Fisher MD    Intubation:     Induction:  Intravenous    Intubated:  Postinduction    Mask Ventilation:  Not attempted    Attempts:  1    Attempted By:  CRNA    Difficult Airway Encountered?: No      Complications:  None    Airway Device:  Supraglottic airway/LMA    Airway Device Size:  4.0    Style/Cuff Inflation:  Other (see comments) (IGEL)    Secured at:  The lips    Placement Verified By:  Capnometry    Complicating Factors:  None    Findings Post-Intubation:  BS equal bilateral and atraumatic/condition of teeth unchanged

## 2023-09-28 NOTE — TRANSFER OF CARE
Anesthesia Transfer of Care Note    Patient: Trae Olivo    Procedure(s) Performed: Procedure(s) (LRB):  EXTRACTION, CATARACT, WITH IOL INSERTION (Left)    Patient location: PACU    Anesthesia Type: general    Transport from OR: Transported from OR on room air with adequate spontaneous ventilation    Post pain: adequate analgesia    Post assessment: no apparent anesthetic complications    Post vital signs: stable    Level of consciousness: sedated    Nausea/Vomiting: no nausea/vomiting    Complications: none    Transfer of care protocol was followed

## 2023-09-28 NOTE — OP NOTE
Date: 9/28/23  Patient: Trae Olivo  Surgeon: Mustapha Hernandez  Assistant: ALVARO Cramer  Preop dx: hypermature cataract OS  Postop dx: same  Procedure: CE c IOL OS (complex)  Complications: none  EBL: none  Anesth: LMA  Detail:     After informed consent was obtained, the patient was taken to the operative suite and placed in supine position on the operative table.  Attention was turned to the left eye where it was prepped and draped in sterile ophthalmic fashion and a speculum was placed.    A paracentesis was placed on the left hand side.  Vision blue was instilled into the anterior chamber and allowed to coat the capsule. Amvisc was then instilled into the anterior chamber and the 2.65 mm keratome was used to create a clear corneal temporal wound.  The cystotome needle was used to create a capsular opening, followed by a capsulotomy.  The cortex was entirely liquefied at this point and was irrigated out.  The Morgagnian nucleus was freely floating and was unable to be removed with the phacoemulsification handpiece.  At this point a peritomy was performed temporally and a 10 mm frown wound was created in the sclera.  The nucleus was removed and the bag came out as well.  There was no vitreous noted.  Amvisc was instilled into the anterior chamber and the 3-piece jane lens was brought onto the field.  It was inserted into the eye with the optic above the pupil and the haptics underneath.  It was then attached to the iris with 10-0 prolene sutures in a sliding Seipser technique.  Two figure of X 10-0 nylon sutures were then used to close the wound and The I/A handpiece was used to remove the viscoelastic.  The wound edges were hydrated and 8-0 vicryl suture was then used to close conjunctiva.      The patient tolerated the procedure well and was taken to recovery in stable condition.     I was present for and performed the second half of the procedure.     CPT: 24811

## 2023-09-28 NOTE — BRIEF OP NOTE
Brief Operative Note  Ophthalmology Service      Date of Procedure: 9/28/2023     Attending Physician: MD David     Assistant: Trey Cramer MD    Pre-Operative Diagnosis: Total, mature age-related cataract [H25.89]     Post-Operative Diagnosis: Same as pre-operative diagnosis    Treatments/Procedures: Extra Capsular Cataract Surgery, Left eye    Intraoperative Findings: Morgagnian Cataract    Anesthesia: General    Complications: None    Estimated Blood Loss: < 5 cc    Specimens: None    -------------------------------------------------------------  Full dictated Operative Report to follow.  -------------------------------------------------------------

## 2023-09-28 NOTE — DISCHARGE SUMMARY
Discharge Summary  Ophthalmology Service    Admit Date: 9/28/2023     Discharge Date: 9/28/2023     Attending Physician: MD David     Discharge Physician: Trey Cramer MD    Discharged Condition: Good    Reason for Admission: Total, mature age-related cataract [H25.89]     Treatments/Procedures: Extra Capsular Cataract Surgery (see dictated report for details).    Disposition: Home    Patient Instructions:   - Resume same diet as prior to surgery  - No heavy lifting   - Pred Forte, Flurbiprofen, Vigamox QID    Patient Instructions:   Current Discharge Medication List        CONTINUE these medications which have NOT CHANGED    Details   calcium carbonate (OS-KESHAWN) 600 mg calcium (1,500 mg) Tab Take 1,200 mg by mouth.      chlorproMAZINE (THORAZINE) 200 MG tablet Take 200 mg by mouth 2 (two) times a day.      furosemide (LASIX) 20 MG tablet Take 20 mg by mouth once daily.      LORazepam (ATIVAN) 0.5 MG tablet Take 0.5 mg by mouth 3 (three) times daily.      polyethylene glycol (GLYCOLAX) 17 gram/dose powder Take 17 g by mouth.      pravastatin (PRAVACHOL) 20 MG tablet Take 20 mg by mouth daily as needed.      propranoloL (INDERAL) 10 MG tablet Take 10 mg by mouth 2 (two) times daily.      ondansetron (ZOFRAN) 4 MG tablet Take 4 mg by mouth every 4 (four) hours as needed.             No discharge procedures on file.

## 2023-09-28 NOTE — ANESTHESIA POSTPROCEDURE EVALUATION
Anesthesia Post Evaluation    Patient: Trae Olivo    Procedure(s) Performed: Procedure(s) (LRB):  EXTRACTION, CATARACT, WITH IOL INSERTION (Left)    Final Anesthesia Type: general      Patient location during evaluation: PACU  Patient participation: Yes- Able to Participate  Level of consciousness: awake and responds to stimulation  Post-procedure vital signs: reviewed and stable  Pain management: adequate  Airway patency: patent    PONV status at discharge: No PONV  Anesthetic complications: no      Cardiovascular status: blood pressure returned to baseline  Respiratory status: unassisted  Hydration status: euvolemic  Follow-up not needed.          Vitals Value Taken Time   /86 09/28/23 0930   Temp 36.5 °C (97.7 °F) 09/28/23 0930   Pulse 84 09/28/23 0930   Resp 17 09/28/23 0930   SpO2 96 % 09/28/23 0930         Event Time   Out of Recovery 08:50:00         Pain/Serafin Score: Serafin Score: 9 (9/28/2023  9:00 AM)  Modified Serafin Score: 19 (9/28/2023  9:00 AM)

## 2023-09-29 ENCOUNTER — CLINICAL SUPPORT (OUTPATIENT)
Dept: OPHTHALMOLOGY | Facility: CLINIC | Age: 78
End: 2023-09-29
Payer: MEDICARE

## 2023-09-29 VITALS — WEIGHT: 212 LBS | HEIGHT: 66 IN | BODY MASS INDEX: 34.07 KG/M2

## 2023-09-29 DIAGNOSIS — Z98.890 POSTOPERATIVE EYE STATE: Primary | ICD-10-CM

## 2023-09-29 PROCEDURE — 99213 OFFICE O/P EST LOW 20 MIN: CPT | Mod: PBBFAC,PN | Performed by: STUDENT IN AN ORGANIZED HEALTH CARE EDUCATION/TRAINING PROGRAM

## 2023-09-29 RX ORDER — ZOLPIDEM TARTRATE 5 MG/1
5 TABLET ORAL NIGHTLY PRN
COMMUNITY
Start: 2023-06-21

## 2023-09-29 RX ORDER — AMMONIUM LACTATE 12 G/100G
LOTION TOPICAL
COMMUNITY

## 2023-09-29 RX ORDER — CICLOPIROX 7.7 MG/G
GEL TOPICAL
COMMUNITY

## 2023-09-29 RX ORDER — LORAZEPAM 2 MG/1
2 TABLET ORAL NIGHTLY PRN
COMMUNITY
Start: 2023-09-22

## 2023-09-29 NOTE — PROGRESS NOTES
HPI     Post-op Evaluation     Additional comments: POD1 s/p CEIOL OS DOS 9/28/2023          Last edited by Kacie Fernandes RN on 9/29/2023  8:45 AM.            Assessment /Plan     For exam results, see Encounter Report.    There are no diagnoses linked to this encounter.  1. s/p extracapsular CEIOL, Left eye  - DOS: 9/29/23  - Lens Used: iris fixated 3 piece MA60AC +16.5  - Doing well  - Lara negative  - No evidence of infection  Plan  - Start PredForte QID, Ketorolac QID, and Vigamox QID  - Nocturnal shield for 1 week  - No heavy lifting for 1 weeks  - Okay to shower but avoid water in eye  - Do not swim for 1 month  - Return immediately for pain, redness or loss of vision (informed of our emergency department which is open 24/7).  - RD and endophthalmitis precautions given  - Written instructions provided to patient upon discharge  - RTC general 1 week

## 2023-10-05 ENCOUNTER — CLINICAL SUPPORT (OUTPATIENT)
Dept: OPHTHALMOLOGY | Facility: CLINIC | Age: 78
End: 2023-10-05
Payer: MEDICARE

## 2023-10-05 VITALS — WEIGHT: 212.06 LBS | BODY MASS INDEX: 34.08 KG/M2 | HEIGHT: 66 IN

## 2023-10-05 DIAGNOSIS — H25.13 AGE-RELATED NUCLEAR CATARACT OF BOTH EYES: ICD-10-CM

## 2023-10-05 DIAGNOSIS — H25.89 TOTAL, MATURE AGE-RELATED CATARACT: ICD-10-CM

## 2023-10-05 DIAGNOSIS — Z98.890 POSTOPERATIVE EYE STATE: Primary | ICD-10-CM

## 2023-10-05 PROCEDURE — 99213 OFFICE O/P EST LOW 20 MIN: CPT | Mod: PBBFAC,PN

## 2023-10-05 NOTE — PROGRESS NOTES
HPI     Post-op Evaluation     Additional comments: 1 week post op s/p CEIOL OS   DOS 9/28/2023           Comments    1 week post op s/p CEIOL OS   DOS 9/28/2023          Last edited by Cherie Ferrera MA on 10/5/2023 10:47 AM.            Assessment /Plan     Assessment/Plan:     1. s/p phaco/IOL OS, POW#1   - DOS: 9/29/23  - Lens Used: iris fixated 3 piece MA60AC +16.5  - Doing well, wound Lara negative, IOP controlled, pt happy with vision  - Stop the following:   - Vigamox   - Maxitrol ointment   - Eye shield   - Activity restrictions  - Taper Pred Forte weekly as follows: TID > BID > daily > stop  - Continue Acular (ketorolac) QID until empty  - Endophthalmitis and RD precautions reviewed    RTC 3 wks for POM1 MRx/DFE Possible pre op for CEIOL OD      There are no diagnoses linked to this encounter.  Wisam Julian MD  [employer] Ophthalmology PGY-2

## 2023-10-30 ENCOUNTER — CLINICAL SUPPORT (OUTPATIENT)
Dept: OPHTHALMOLOGY | Facility: CLINIC | Age: 78
End: 2023-10-30
Payer: MEDICARE

## 2023-10-30 VITALS — BODY MASS INDEX: 34.08 KG/M2 | WEIGHT: 212.06 LBS | HEIGHT: 66 IN

## 2023-10-30 DIAGNOSIS — H25.811 COMBINED FORM OF AGE-RELATED CATARACT, RIGHT EYE: ICD-10-CM

## 2023-10-30 DIAGNOSIS — Z98.890 POST-OPERATIVE STATE: Primary | ICD-10-CM

## 2023-10-30 PROCEDURE — 99214 OFFICE O/P EST MOD 30 MIN: CPT | Mod: PBBFAC,PN | Performed by: STUDENT IN AN ORGANIZED HEALTH CARE EDUCATION/TRAINING PROGRAM

## 2023-10-30 RX ORDER — SODIUM CHLORIDE 0.9 % (FLUSH) 0.9 %
10 SYRINGE (ML) INJECTION
Status: ACTIVE | OUTPATIENT
Start: 2023-11-28

## 2023-10-30 RX ORDER — PROPARACAINE HYDROCHLORIDE 5 MG/ML
1 SOLUTION/ DROPS OPHTHALMIC
Status: CANCELLED | OUTPATIENT
Start: 2023-11-28

## 2023-10-30 RX ORDER — CYCLOPENTOLAT/TROPIC/PHENYLEPH 1%-1%-2.5%
1 DROPS (EA) OPHTHALMIC (EYE)
Status: CANCELLED | OUTPATIENT
Start: 2023-11-28 | End: 2023-11-28

## 2023-10-30 NOTE — H&P
Pre-Operative History & Physical  Ophthalmology      SUBJECTIVE:     History of Present Illness:  Patient is a 78 y.o. male presents with cataract and decreased vision right.     MEDICATIONS: (Not in a hospital admission)      ALLERGIES: Review of patient's allergies indicates:  No Known Allergies    PAST MEDICAL HISTORY:   Past Medical History:   Diagnosis Date    Bipolar disorder, unspecified     Cataract     Hypertension     Insomnia     Supraventricular tachycardia      PAST SURGICAL HISTORY:   Past Surgical History:   Procedure Laterality Date    CATARACT EXTRACTION W/  INTRAOCULAR LENS IMPLANT Left 9/28/2023    Procedure: EXTRACTION, CATARACT, WITH IOL INSERTION;  Surgeon: Mustapha Hernandez MD;  Location: [facility];  Service: Ophthalmology;  Laterality: Left;     PAST FAMILY HISTORY: History reviewed. No pertinent family history.  SOCIAL HISTORY:   Social History     Tobacco Use    Smoking status: Never    Smokeless tobacco: Never   Substance Use Topics    Alcohol use: Never    Drug use: Never        MENTAL STATUS: Alert    REVIEW OF SYSTEMS: Negative    OBJECTIVE:     Vital Signs (Most Recent)       Physical Exam:  General: NAD  HEENT: see clinic note for full details  Lungs: Adequate respirations  Heart: + pulses  Abdomen: Soft    ASSESSMENT/PLAN:     Patient is a 78 y.o. male with intelectual disability, schizophrenia, anxiety who developed decreased vision and cataract right eye.      - Plan for surgical correction cataract extraction and IOL implantation.    - Risks/benefits/alternatives of the procedure including, but not limited to scarring, bleeding, infection, loss or decreased vision, undercorrection, overcorrection, and/or need for possible repeat surgery discussed with the patient and family.   - Informed consent obtained prior to surgery and the patient/family voiced good understanding.    Giacomo Arthur MD   Ophthalmology  10/30/2023  12:40 PM

## 2023-10-30 NOTE — PROGRESS NOTES
"HPI     Post-op Evaluation     Additional comments: MRx/DFE. 1 month post op s/p CEIOL OS   DOS 9/28/2023    *patient can not have coke "pop" today*           Comments    1 month post op s/p CEIOL OS   DOS 9/28/2023          Last edited by Cherie Ferrera MA on 10/30/2023 12:06 PM.      Assessment /Plan       1. s/p phaco/IOL OS with manual nucleus removal  - DOS: 9/29/23  - Lens Used: iris fixated 3 piece MA60AC +16.5    2. Visually significant cataract OD  - unable to determine VA dt hx intellectual disability, schizophrenia, anxiety; but OS went from F&F to 20/80 with pictures after cataract surgery and right eye appears to be a dense white cataract.   - pt could not consent for self, [facility] [place] would need to consent for surgery; fax sent  - B scan OU with flat retina  - Patient definitely with visually significant cataract, but given his severe cognitive impairment, high threshold for surgery. He would not be able to follow post op instructions and would require around the clock monitoring, especially early on in post op period  - Caregiver states that patient is having more trouble with everyday tasks due to vision decrease.   - 10/30/23: OS healed. Doing well. Plan to proceed with right eye with Dr. Hernandez on 11/28/23. OS was performed based on measures of right AL.     RTC POD1      "

## 2023-11-16 DIAGNOSIS — Z98.890 POST-OPERATIVE STATE: Primary | ICD-10-CM

## 2023-11-16 NOTE — PROGRESS NOTES
Referral placed for cardiology. Per anesthesia, cancelling case on 11/28/23 to be seen by cardiology prior noting requirement of pressors for last case under general anesthesia.

## 2023-12-26 ENCOUNTER — OFFICE VISIT (OUTPATIENT)
Dept: CARDIOLOGY | Facility: CLINIC | Age: 78
End: 2023-12-26
Payer: MEDICARE

## 2023-12-26 VITALS
OXYGEN SATURATION: 97 % | BODY MASS INDEX: 31.57 KG/M2 | WEIGHT: 184.94 LBS | RESPIRATION RATE: 18 BRPM | HEART RATE: 98 BPM | HEIGHT: 64 IN | DIASTOLIC BLOOD PRESSURE: 97 MMHG | SYSTOLIC BLOOD PRESSURE: 152 MMHG

## 2023-12-26 DIAGNOSIS — R00.0 TACHYCARDIA: ICD-10-CM

## 2023-12-26 DIAGNOSIS — E78.2 MIXED HYPERLIPIDEMIA: ICD-10-CM

## 2023-12-26 DIAGNOSIS — Z98.890 POST-OPERATIVE STATE: ICD-10-CM

## 2023-12-26 DIAGNOSIS — R60.9 EDEMA, UNSPECIFIED TYPE: ICD-10-CM

## 2023-12-26 DIAGNOSIS — Z01.810 PREOP CARDIOVASCULAR EXAM: Primary | ICD-10-CM

## 2023-12-26 PROCEDURE — 99214 OFFICE O/P EST MOD 30 MIN: CPT | Mod: PBBFAC | Performed by: INTERNAL MEDICINE

## 2023-12-26 PROCEDURE — 93005 ELECTROCARDIOGRAM TRACING: CPT

## 2023-12-26 RX ORDER — NIRMATRELVIR AND RITONAVIR 300-100 MG
KIT ORAL
COMMUNITY
Start: 2023-12-17

## 2023-12-26 RX ORDER — ACETAMINOPHEN 500 MG
1000 TABLET ORAL EVERY 6 HOURS PRN
COMMUNITY

## 2023-12-26 NOTE — PATIENT INSTRUCTIONS
Follow up in 4 months or sooner if needed    Patient to be scheduled for nuclear stress test and echocardiogram, call number on handout given to schedule testing.     Monitor bp and call or fax readings to clinic for review after 2 weeks, take blood pressure one to two hours after medications

## 2023-12-26 NOTE — PROGRESS NOTES
12/26/2023 3:06 PM    Subjective:     CHIEF COMPLAINT:   Chief Complaint   Patient presents with    new pateint, needs cleareacne      For cataract surgery                            HPI:    Mr. Trae Olivo is a 78 y.o. male.      Today the patient comes for a new patient evaluation.  The patient has history of tachycardia   Patent unable to give history.  Most of history obtained from care taker.      CP:  The patient has no chest discomfort.      SOB:  The patient denies shortness of breath Has SMITH    EDEMA:  The patient denies edema.        ORTHOPNEA:  The patient denies orthopnea.  No PND.      SYNCOPE:  The patient denies near syncope.  No syncope.   Denies getting lightheaded.    PALPITATIONS:  The patient has no palpitations.    LEVEL OF EXERTION:  The patient does ADL.  The patient does not have symptoms with this level of exertion.  The patient's level of exertion is limited.  METS:  The patient does the following activities:    stroll (2 METS)    Past Medical History    Patient Active Problem List   Diagnosis    Total, mature age-related cataract    Preop cardiovascular exam    Mixed hyperlipidemia    Edema    Tachycardia       Surgical History    Past Surgical History:   Procedure Laterality Date    CATARACT EXTRACTION W/  INTRAOCULAR LENS IMPLANT Left 9/28/2023    Procedure: EXTRACTION, CATARACT, WITH IOL INSERTION;  Surgeon: Mustapha Hernandez MD;  Location: [facility];  Service: Ophthalmology;  Laterality: Left;       Social History     Socioeconomic History    Marital status: Unknown   Tobacco Use    Smoking status: Never    Smokeless tobacco: Never   Substance and Sexual Activity    Alcohol use: Never    Drug use: Never   Social History Narrative    ** Merged History Encounter **            History reviewed. No pertinent family history.  Review of patient's allergies indicates:  No Known Allergies    Current Medications    Current Outpatient Medications   Medication Instructions    acetaminophen  "(TYLENOL) 1,000 mg, Oral, Every 6 hours PRN    ammonium lactate (LAC-HYDRIN) 12 % lotion 1 application to affected area Externally Twice a day    calcium carbonate (OS-KESHAWN) 1,200 mg, Oral    chlorproMAZINE (THORAZINE) 200 mg, Oral, Nightly    ciclopirox 0.77 % Gel 1 application to affected area Externally Twice a day    furosemide (LASIX) 20 mg, Oral, Daily    LORazepam (ATIVAN) 0.5 mg, Oral, 3 times daily    LORazepam (ATIVAN) 2 mg, Oral, Nightly PRN    ondansetron (ZOFRAN) 4 mg, Oral, Every 4 hours PRN    PAXLOVID 300 mg (150 mg x 2)-100 mg copackaged tablets (EUA) Oral    polyethylene glycol (GLYCOLAX) 17 g, Oral    pravastatin (PRAVACHOL) 20 mg, Oral, Daily    propranoloL (INDERAL) 10 mg, Oral, 2 times daily    zolpidem (AMBIEN) 5 mg, Oral, Nightly PRN         ROS:   refer to HPI     Objective:     BP Readings from Last 3 Encounters:   12/26/23 (!) 152/97   09/28/23 134/86   08/26/22 130/85        Pulse Readings from Last 3 Encounters:   12/26/23 98   09/28/23 84   08/26/22 89        Temp Readings from Last 3 Encounters:   09/28/23 97.7 °F (36.5 °C) (Axillary)   08/26/22 98.1 °F (36.7 °C) (Oral)       Wt Readings from Last 3 Encounters:   12/26/23 83.9 kg (184 lb 15.5 oz)   10/30/23 96.2 kg (212 lb 1.3 oz)   10/05/23 96.2 kg (212 lb 1.3 oz)         PE:  Blood pressure (!) 152/97, pulse 98, resp. rate 18, height 5' 4" (1.626 m), weight 83.9 kg (184 lb 15.5 oz), SpO2 97 %.   GENERAL:  Ambulates  CONSTITUTIONAL:  No acute distress.  Not ill appearing.  NECK:  No cervical adenopathy.  No carotid bruit.  CARDIOVASCULAR:  Normal rate.  Regular rhythm.  No murmur.  PULMONARY:  No respiratory distress.  No wheezing.  No crackles.  ABDOMINAL:  No distention.  No tenderness.  MUSCULOSKELETAL:  No deformity.  No edema  SKIN:  No bruising.  No rash.  NEUROLOGICAL:  Unable to cooperate with neurologic exam.                                                                                                                         " "                                                                                                                                                                                                                                                                                                                                                     CARDIAC TESTING:  Echocardiogram  No results found for this or any previous visit.      Stress Test  No results found for this or any previous visit.     Coronary Angiogram  No results found for this or any previous visit.     Holter Monitor  No cardiac monitor results found for the past 12 months    Last BMP BMP  Lab Results   Component Value Date     (L) 08/26/2022    K 4.0 08/26/2022    CO2 23 08/26/2022    BUN 23.1 08/26/2022    CREATININE 1.14 08/26/2022    CALCIUM 9.0 08/26/2022    EGFRNORACEVR 50 08/26/2022      Mag No components found for: "MAG"  Last CBC     Lab Results   Component Value Date    WBC 6.6 08/26/2022    WBC 7 08/26/2022    HGB 11.8 08/26/2022    HCT 36.4 08/26/2022    MCV 90.1 08/26/2022     (L) 08/26/2022           BNP    Lab Results   Component Value Date    BNP <10.0 08/26/2022     Last lipids  No results found for: "CHOL", "HDL", "LDL", "TRIG", "TOTALCHOLEST"   LFT     Lab Results   Component Value Date    ALT 15 08/26/2022    AST 19 08/26/2022       Assessment:     1. Post-operative state  - Ambulatory referral/consult to Cardiology    2. Preop cardiovascular exam    3. Mixed hyperlipidemia    4. Edema, unspecified type    5. Tachycardia    10 Year Cardiovascular Risk:  The ASCVD Risk score (Lynn DK, et al., 2019) failed to calculate for the following reasons:    Cannot find a previous HDL lab    Cannot find a previous total cholesterol lab    Unable to determine if patient is Non-   BMI:  Body mass index is 31.75 kg/m².  Patient is obese (BMI 30-34.9)  Tobacco:  none     Alcohol:  none    Last PCP visit:  Patient " "does not have a PCP or has not yet seen their PCP    Plan:     MEDICATIONS:  no changes made    WORK UP:    Echo:  Ordered to check EF for preoperative risk assessment    Stress test:  Order lexiscan nuclear stress test for preop risk assessment     "HYPERTENSION MANAGEMENT  Advised patient to monitor and record BP readings at home and provide Cardiology Clinic with a copy of these records.  Patient needs BP checked and readings faxed or mailed to cardiology clinic.     PREOP EVAL  Patient needs echo and nuclear stress test     FOLLOW UP:  4 months    Jason Reyna MD  Cardiology Attending     No future appointments.  "

## 2024-01-25 ENCOUNTER — HOSPITAL ENCOUNTER (OUTPATIENT)
Dept: CARDIOLOGY | Facility: HOSPITAL | Age: 79
Discharge: HOME | End: 2024-01-25
Attending: INTERNAL MEDICINE
Payer: MEDICARE

## 2024-01-25 VITALS
DIASTOLIC BLOOD PRESSURE: 90 MMHG | WEIGHT: 184 LBS | HEIGHT: 64 IN | SYSTOLIC BLOOD PRESSURE: 156 MMHG | BODY MASS INDEX: 31.41 KG/M2

## 2024-01-25 DIAGNOSIS — Z01.810 PREOP CARDIOVASCULAR EXAM: ICD-10-CM

## 2024-01-25 LAB
AV INDEX (PROSTH): 1.21
AV MEAN GRADIENT: 2 MMHG
AV PEAK GRADIENT: 3 MMHG
AV VALVE AREA BY VELOCITY RATIO: 4.38 CM²
AV VALVE AREA: 4.54 CM²
AV VELOCITY RATIO: 1.16
BSA FOR ECHO PROCEDURE: 1.94 M2
CV ECHO LV RWT: 0.67 CM
DOP CALC AO PEAK VEL: 0.86 M/S
DOP CALC AO VTI: 16 CM
DOP CALC LVOT AREA: 3.8 CM2
DOP CALC LVOT DIAMETER: 2.19 CM
DOP CALC LVOT PEAK VEL: 1 M/S
DOP CALC LVOT STROKE VOLUME: 72.66 CM3
DOP CALC MV VTI: 16.8 CM
DOP CALCLVOT PEAK VEL VTI: 19.3 CM
E WAVE DECELERATION TIME: 89.98 MSEC
E/A RATIO: 0.86
ECHO LV POSTERIOR WALL: 1.11 CM (ref 0.6–1.1)
FRACTIONAL SHORTENING: 34 % (ref 28–44)
INTERVENTRICULAR SEPTUM: 1.28 CM (ref 0.6–1.1)
LEFT ATRIUM SIZE: 2.88 CM
LEFT INTERNAL DIMENSION IN SYSTOLE: 2.18 CM (ref 2.1–4)
LEFT VENTRICLE DIASTOLIC VOLUME INDEX: 23.31 ML/M2
LEFT VENTRICLE DIASTOLIC VOLUME: 44.06 ML
LEFT VENTRICLE MASS INDEX: 66 G/M2
LEFT VENTRICLE SYSTOLIC VOLUME INDEX: 8.3 ML/M2
LEFT VENTRICLE SYSTOLIC VOLUME: 15.75 ML
LEFT VENTRICULAR INTERNAL DIMENSION IN DIASTOLE: 3.3 CM (ref 3.5–6)
LEFT VENTRICULAR MASS: 123.97 G
LV LATERAL E/E' RATIO: 9.88 M/S
LVOT MG: 2.12 MMHG
LVOT MV: 0.66 CM/S
MV MEAN GRADIENT: 3 MMHG
MV PEAK A VEL: 0.92 M/S
MV PEAK E VEL: 0.79 M/S
MV PEAK GRADIENT: 4 MMHG
MV STENOSIS PRESSURE HALF TIME: 26.1 MS
MV VALVE AREA BY CONTINUITY EQUATION: 4.33 CM2
MV VALVE AREA P 1/2 METHOD: 8.43 CM2
PISA TR MAX VEL: 2.41 M/S
RA PRESSURE ESTIMATED: 3 MMHG
RIGHT VENTRICULAR END-DIASTOLIC DIMENSION: 3.11 CM
RV TB RVSP: 5 MMHG
TDI LATERAL: 0.08 M/S
TR MAX PG: 23 MMHG
TV REST PULMONARY ARTERY PRESSURE: 26 MMHG
Z-SCORE OF LEFT VENTRICULAR DIMENSION IN END DIASTOLE: -4.72
Z-SCORE OF LEFT VENTRICULAR DIMENSION IN END SYSTOLE: -3.18

## 2024-01-25 PROCEDURE — 93306 TTE W/DOPPLER COMPLETE: CPT

## 2024-01-26 ENCOUNTER — HOSPITAL ENCOUNTER (OUTPATIENT)
Dept: RADIOLOGY | Facility: HOSPITAL | Age: 79
Discharge: HOME | End: 2024-01-26
Attending: INTERNAL MEDICINE
Payer: MEDICARE

## 2024-01-26 ENCOUNTER — HOSPITAL ENCOUNTER (OUTPATIENT)
Dept: CARDIOLOGY | Facility: HOSPITAL | Age: 79
Discharge: HOME | End: 2024-01-26
Attending: INTERNAL MEDICINE
Payer: MEDICARE

## 2024-01-26 ENCOUNTER — DOCUMENTATION ONLY (OUTPATIENT)
Dept: CARDIOLOGY | Facility: HOSPITAL | Age: 79
End: 2024-01-26
Payer: MEDICARE

## 2024-01-26 VITALS
SYSTOLIC BLOOD PRESSURE: 130 MMHG | WEIGHT: 184.06 LBS | RESPIRATION RATE: 20 BRPM | HEIGHT: 64 IN | DIASTOLIC BLOOD PRESSURE: 85 MMHG | HEART RATE: 110 BPM | BODY MASS INDEX: 31.42 KG/M2

## 2024-01-26 DIAGNOSIS — Z01.810 PREOP CARDIOVASCULAR EXAM: ICD-10-CM

## 2024-01-26 LAB
CV STRESS BASE HR: 109 BPM
DIASTOLIC BLOOD PRESSURE: 85 MMHG
NUC REST EJECTION FRACTION: 81
NUC STRESS EJECTION FRACTION: 73 %
OHS CV CPX 85 PERCENT MAX PREDICTED HEART RATE MALE: 122
OHS CV CPX ESTIMATED METS: 1
OHS CV CPX MAX PREDICTED HEART RATE: 144
OHS CV CPX PATIENT IS FEMALE: 0
OHS CV CPX PATIENT IS MALE: 1
OHS CV CPX PEAK DIASTOLIC BLOOD PRESSURE: 85 MMHG
OHS CV CPX PEAK HEAR RATE: 129 BPM
OHS CV CPX PEAK RATE PRESSURE PRODUCT: NORMAL
OHS CV CPX PEAK SYSTOLIC BLOOD PRESSURE: 130 MMHG
OHS CV CPX PERCENT MAX PREDICTED HEART RATE ACHIEVED: 90
OHS CV CPX RATE PRESSURE PRODUCT PRESENTING: NORMAL
STRESS ECHO POST EXERCISE DUR MIN: 5 MINUTES
STRESS ECHO POST EXERCISE DUR SEC: 51 SECONDS
SYSTOLIC BLOOD PRESSURE: 130 MMHG

## 2024-01-26 PROCEDURE — 78452 HT MUSCLE IMAGE SPECT MULT: CPT

## 2024-01-26 PROCEDURE — 63600175 PHARM REV CODE 636 W HCPCS: Performed by: INTERNAL MEDICINE

## 2024-01-26 RX ORDER — REGADENOSON 0.08 MG/ML
0.4 INJECTION, SOLUTION INTRAVENOUS
Status: COMPLETED | OUTPATIENT
Start: 2024-01-26 | End: 2024-01-26

## 2024-01-26 RX ADMIN — REGADENOSON 0.4 MG: 0.08 INJECTION, SOLUTION INTRAVENOUS at 08:01

## 2024-01-26 NOTE — PROGRESS NOTES
Cardiology Attending  01/26/2024 4:46 PM    Pre-Op Cardiac Risk Assessment  The patient had the following CARDIAC TESTING:  Echo:    Results for orders placed during the hospital encounter of 01/25/24    Echo    Interpretation Summary    Left Ventricle: The left ventricle is normal in size. Normal wall thickness. There is normal systolic function with a visually estimated ejection fraction of 60 - 65%.    Right Ventricle: Normal right ventricular cavity size. Systolic function is normal.    Aortic Valve: The aortic valve is a trileaflet valve. There is no stenosis. Aortic valve peak velocity is 0.86 m/s. Mean gradient is 2 mmHg.    Mitral Valve: The mitral valve is structurally normal.    Tricuspid Valve: The tricuspid valve is structurally normal. There is trace regurgitation.    Pulmonic Valve: There is mild regurgitation.    Pulmonary Artery: The estimated pulmonary artery systolic pressure is 26 mmHg.    IVC/SVC: Normal venous pressure at 3 mmHg.    Stress test:  Results for orders placed during the hospital encounter of 01/26/24    Nuclear Stress - Cardiology Interpreted    Interpretation Summary    Normal myocardial perfusion scan. There is no evidence of myocardial ischemia or infarction.    The gated perfusion images showed an ejection fraction of 81% at rest. The gated perfusion images showed an ejection fraction of 73% post stress.    The patient reported no chest pain during the stress test.    The nuclear stress test is  fair quality.  On the nuclear stress test the EF is supranormal.  If the patient has an echo, the EF on the echo is considered more accurate.    The patient has a low risk nuclear stress test    No ischemia noted     Coronary angiogram:  No results found for this or any previous visit.       Current Outpatient Medications   Medication Instructions    acetaminophen (TYLENOL) 1,000 mg, Oral, Every 6 hours PRN    ammonium lactate (LAC-HYDRIN) 12 % lotion 1 application to affected area  Externally Twice a day    calcium carbonate (OS-KESHAWN) 1,200 mg, Oral    chlorproMAZINE (THORAZINE) 200 mg, Oral, Nightly    ciclopirox 0.77 % Gel 1 application to affected area Externally Twice a day    furosemide (LASIX) 20 mg, Oral, Daily    LORazepam (ATIVAN) 0.5 mg, Oral, 3 times daily    LORazepam (ATIVAN) 2 mg, Oral, Nightly PRN    ondansetron (ZOFRAN) 4 mg, Oral, Every 4 hours PRN    PAXLOVID 300 mg (150 mg x 2)-100 mg copackaged tablets (EUA) Oral    polyethylene glycol (GLYCOLAX) 17 g, Oral    pravastatin (PRAVACHOL) 20 mg, Oral, Daily    propranoloL (INDERAL) 10 mg, Oral, 2 times daily    zolpidem (AMBIEN) 5 mg, Oral, Nightly PRN       Pre-Op Cardiac Risk Assessment:    The patient is at low cardiac risk for a low risk procedure.       PRE-OP RECOMMENDATIONS  Continue the propranolol in the perioperative period.  The patient may be at risk for developing heart failure.  Recommend monitoring fluid status in the perioperative period.      Jason Reyna MD

## 2024-04-30 ENCOUNTER — OFFICE VISIT (OUTPATIENT)
Dept: CARDIOLOGY | Facility: CLINIC | Age: 79
End: 2024-04-30
Payer: MEDICARE

## 2024-04-30 VITALS
DIASTOLIC BLOOD PRESSURE: 84 MMHG | OXYGEN SATURATION: 97 % | BODY MASS INDEX: 31.82 KG/M2 | TEMPERATURE: 98 F | RESPIRATION RATE: 20 BRPM | HEIGHT: 64 IN | WEIGHT: 186.38 LBS | SYSTOLIC BLOOD PRESSURE: 133 MMHG | HEART RATE: 105 BPM

## 2024-04-30 DIAGNOSIS — E78.2 MIXED HYPERLIPIDEMIA: ICD-10-CM

## 2024-04-30 DIAGNOSIS — R00.0 TACHYCARDIA: ICD-10-CM

## 2024-04-30 DIAGNOSIS — Z01.810 PREOP CARDIOVASCULAR EXAM: Primary | ICD-10-CM

## 2024-04-30 PROCEDURE — 99215 OFFICE O/P EST HI 40 MIN: CPT | Mod: PBBFAC | Performed by: INTERNAL MEDICINE

## 2024-04-30 NOTE — PROGRESS NOTES
Cardiology Attending    I evaluated Trae Olivo and discussed the patient's symptoms, findings, and management plan with the resident.  Please see the Cardiology note for details.           PREOP CARDIAC RISK ASSESSMENT     Cardiac risk:  The patient is at low cardiac risk for a low risk procedure.

## 2024-04-30 NOTE — PROGRESS NOTES
CHIEF COMPLAINT:   Chief Complaint   Patient presents with    Follow-up                   Review of patient's allergies indicates:  No Known Allergies                                       HPI:  Trae Olivo 78 y.o. male with past medical history of hypertension and intellectual disability presents for cardiac risk assessment for cataract surgery.  History obtained via caretaker.  Patient has no significant cardiovascular history.  Was previously seen on 12/26/2023, at that time nuclear stress test and echocardiogram were ordered for further evaluation.  Echo revealed normal EF of 60-65%.  Nuclear stress test revealed normal myocardial perfusion.  Patient has no complaints at this time.                                                                                                                                                                                                                                                                                                                                                                                                                                                                                        CARDIAC TESTING:  Results for orders placed during the hospital encounter of 01/25/24    Echo    Interpretation Summary    Left Ventricle: The left ventricle is normal in size. Normal wall thickness. There is normal systolic function with a visually estimated ejection fraction of 60 - 65%.    Right Ventricle: Normal right ventricular cavity size. Systolic function is normal.    Aortic Valve: The aortic valve is a trileaflet valve. There is no stenosis. Aortic valve peak velocity is 0.86 m/s. Mean gradient is 2 mmHg.    Mitral Valve: The mitral valve is structurally normal.    Tricuspid Valve: The tricuspid valve is structurally normal. There is trace regurgitation.    Pulmonic Valve: There is mild regurgitation.    Pulmonary Artery: The estimated pulmonary artery  systolic pressure is 26 mmHg.    IVC/SVC: Normal venous pressure at 3 mmHg.    Results for orders placed during the hospital encounter of 01/26/24    Nuclear Stress - Cardiology Interpreted    Interpretation Summary    Normal myocardial perfusion scan. There is no evidence of myocardial ischemia or infarction.    The gated perfusion images showed an ejection fraction of 81% at rest. The gated perfusion images showed an ejection fraction of 73% post stress.    The patient reported no chest pain during the stress test.    The nuclear stress test is  fair quality.  On the nuclear stress test the EF is supranormal.  If the patient has an echo, the EF on the echo is considered more accurate.    The patient has a low risk nuclear stress test    No ischemia noted     No results found for this or any previous visit.       Patient Active Problem List   Diagnosis    Total, mature age-related cataract    Preop cardiovascular exam    Mixed hyperlipidemia    Edema    Tachycardia     Past Surgical History:   Procedure Laterality Date    CATARACT EXTRACTION W/  INTRAOCULAR LENS IMPLANT Left 9/28/2023    Procedure: EXTRACTION, CATARACT, WITH IOL INSERTION;  Surgeon: Mustapha Hernandez MD;  Location: [facility];  Service: Ophthalmology;  Laterality: Left;     Social History     Socioeconomic History    Marital status: Unknown   Tobacco Use    Smoking status: Never    Smokeless tobacco: Never   Substance and Sexual Activity    Alcohol use: Never    Drug use: Never   Social History Narrative    ** Merged History Encounter **             No family history on file.      Current Outpatient Medications:     acetaminophen (TYLENOL) 500 MG tablet, Take 1,000 mg by mouth every 6 (six) hours as needed for Pain., Disp: , Rfl:     ammonium lactate (LAC-HYDRIN) 12 % lotion, 1 application to affected area Externally Twice a day, Disp: , Rfl:     calcium carbonate (OS-KESHAWN) 600 mg calcium (1,500 mg) Tab, Take 1,200 mg by mouth., Disp: , Rfl:      chlorproMAZINE (THORAZINE) 200 MG tablet, Take 200 mg by mouth nightly., Disp: , Rfl:     ciclopirox 0.77 % Gel, 1 application to affected area Externally Twice a day, Disp: , Rfl:     LORazepam (ATIVAN) 0.5 MG tablet, Take 0.5 mg by mouth 3 (three) times daily., Disp: , Rfl:     LORazepam (ATIVAN) 2 MG Tab, Take 2 mg by mouth nightly as needed., Disp: , Rfl:     ondansetron (ZOFRAN) 4 MG tablet, Take 4 mg by mouth every 4 (four) hours as needed., Disp: , Rfl:     pravastatin (PRAVACHOL) 20 MG tablet, Take 20 mg by mouth once daily., Disp: , Rfl:     propranoloL (INDERAL) 10 MG tablet, Take 10 mg by mouth 2 (two) times daily., Disp: , Rfl:     furosemide (LASIX) 20 MG tablet, Take 20 mg by mouth once daily. (Patient not taking: Reported on 4/30/2024), Disp: , Rfl:     PAXLOVID 300 mg (150 mg x 2)-100 mg copackaged tablets (EUA), Take by mouth. (Patient not taking: Reported on 4/30/2024), Disp: , Rfl:     polyethylene glycol (GLYCOLAX) 17 gram/dose powder, Take 17 g by mouth. (Patient not taking: Reported on 4/30/2024), Disp: , Rfl:     zolpidem (AMBIEN) 5 MG Tab, Take 5 mg by mouth nightly as needed. (Patient not taking: Reported on 4/30/2024), Disp: , Rfl:     Current Facility-Administered Medications:     sodium chloride 0.9% flush 10 mL, 10 mL, Intravenous, PRN, Trey Cramer MD    sodium chloride 0.9% flush 10 mL, 10 mL, Intravenous, PRN, Giacomo Arthur MD    Facility-Administered Medications Ordered in Other Visits:     LIDOcaine (PF) 10 mg/ml (1%) injection 10 mg, 1 mL, Intradermal, Once, Susanne Martínez FNP     ROS:                                                                                                                                                                             Constitutional: no fever/chills  EENT: no sore throat, ear pain, sinus pain/congestion, nasal congestion/drainage  Respiratory: no cough, no wheezing, no shortness of breath  Cardiovascular: no chest pain, no  "palpitations, no edema  Gastrointestinal: no nausea, vomiting, or diarrhea. No abdominal pain  Genitourinary: no dysuria, no urinary frequency or urgency, no hematuria  Integumentary: no skin rash or abnormal lesion  Neurologic: no headache, no dizziness, no weakness or numbness     Blood pressure 133/84, pulse 105, temperature 98.3 °F (36.8 °C), temperature source Oral, resp. rate 20, height 5' 4" (1.626 m), weight 84.6 kg (186 lb 6.4 oz), SpO2 97%.     PE:  General: well-developed, well-nourished, no acute distress  Eye: clear conjunctiva, eyelids normal  Head: normocephalic and atraumatic  Neck: full range of motion, supple  Respiratory: clear to auscultation bilaterally without wheezes, rales, rhonchi  Cardiovascular: regular rate and rhythm without murmurs. No JVD. Capillary refill within normal limits.  Musculoskeletal: full range of motion of all extremities without limitation or discomfort  Integumentary: no rashes or skin lesions present, no erythema  Neurologic: AAO x 3,  no dysarthria.  Psychiatric: cooperative with exam.        ASSESSMENT/PLAN:    Preoperative cardiac risk assessment  -echocardiogram revealed EF 60-65%.    -nuclear stress test was accomplished revealing no evidence of myocardial ischemia or infarction.  -RCRI Index: class 1 risk  -METs 2    Patient is at low risk of cardiac event for a low risk procedure.     RTC as needed.       Richie Ward MD  [facility] PGY-1     "

## 2024-05-06 ENCOUNTER — TELEPHONE (OUTPATIENT)
Dept: OPHTHALMOLOGY | Facility: CLINIC | Age: 79
End: 2024-05-06
Payer: MEDICARE

## 2024-05-06 NOTE — TELEPHONE ENCOUNTER
----- Message from Beverly Lock sent at 5/6/2024  3:41 PM CDT -----  Regarding: Cardiac Preoperative Clearance  Contact: Dixie Colin from [facility] called to find out when will Mr. Olivo be scheduled for sx  due being cleared. Please give her a call or the nurse on duty@  [phone], to have him scheduled.     Thanks

## 2024-05-06 NOTE — TELEPHONE ENCOUNTER
Is a date set for pt to have cataract surgery OD? (See visit note from cardiologist)  Please advise.

## 2024-05-07 NOTE — TELEPHONE ENCOUNTER
Tracy Riley MD Lebleu, Anjelique, MA  Caller: Unspecified (Yesterday,  4:19 PM)  Can he be scheduled for surgery?    05/07/2024 9:08 AM   We can schedule him. He hasn't been seen by us since 10/30/2023.     Per Dr. Riley schedule the patient for a cataract eval as soon as we have an opening.     Sent message to the front office staff.

## 2024-05-29 ENCOUNTER — OFFICE VISIT (OUTPATIENT)
Dept: OPHTHALMOLOGY | Facility: CLINIC | Age: 79
End: 2024-05-29
Payer: MEDICARE

## 2024-05-29 ENCOUNTER — ANESTHESIA EVENT (OUTPATIENT)
Dept: SURGERY | Facility: HOSPITAL | Age: 79
End: 2024-05-29
Payer: MEDICARE

## 2024-05-29 VITALS — BODY MASS INDEX: 31.84 KG/M2 | WEIGHT: 186.5 LBS | HEIGHT: 64 IN

## 2024-05-29 DIAGNOSIS — H25.811 COMBINED FORM OF AGE-RELATED CATARACT, RIGHT EYE: Primary | ICD-10-CM

## 2024-05-29 PROCEDURE — 99214 OFFICE O/P EST MOD 30 MIN: CPT | Mod: PBBFAC,PN,25 | Performed by: STUDENT IN AN ORGANIZED HEALTH CARE EDUCATION/TRAINING PROGRAM

## 2024-05-29 PROCEDURE — 92136 OPHTHALMIC BIOMETRY: CPT | Mod: PBBFAC,PN,RT,LT,GC | Performed by: STUDENT IN AN ORGANIZED HEALTH CARE EDUCATION/TRAINING PROGRAM

## 2024-05-29 RX ORDER — PHENYLEPHRINE HYDROCHLORIDE 25 MG/ML
1 SOLUTION/ DROPS OPHTHALMIC
OUTPATIENT
Start: 2024-05-29

## 2024-05-29 RX ORDER — SODIUM CHLORIDE 0.9 % (FLUSH) 0.9 %
10 SYRINGE (ML) INJECTION
Status: DISPENSED | OUTPATIENT
Start: 2024-05-29

## 2024-05-29 RX ORDER — TROPICAMIDE 10 MG/ML
1 SOLUTION/ DROPS OPHTHALMIC
OUTPATIENT
Start: 2024-05-29

## 2024-05-29 RX ORDER — CYCLOPENTOLATE HYDROCHLORIDE 10 MG/ML
1 SOLUTION/ DROPS OPHTHALMIC
OUTPATIENT
Start: 2024-05-29

## 2024-05-29 RX ORDER — PROPARACAINE HYDROCHLORIDE 5 MG/ML
1 SOLUTION/ DROPS OPHTHALMIC
Status: CANCELLED | OUTPATIENT
Start: 2024-05-29

## 2024-05-29 NOTE — ANESTHESIA PREPROCEDURE EVALUATION
Trae Olivo is a 78 y.o. male PRESENTING FOR EXTRACTION, CATARACT (Right) with a history of   -CATARACTS ( of note poorly tolerated GA with LMA Sept 2023 ? )     Vitals:    06/06/24 0510 06/06/24 0511 06/06/24 0535 06/06/24 0619   BP:  (!) 141/85 (!) 141/85 (!) 140/78   BP Location:  Left arm     Patient Position:  Lying     Pulse:  85  88   Resp:  18  20   Temp:  36.6 °C (97.9 °F)  36.3 °C (97.3 °F)   TempSrc:  Oral  Temporal   SpO2:  99%  100%   Weight: 81.5 kg (179 lb 9.6 oz)          -HTN  -HLD  -SVT  -BIPOLAR  -INSOMNIA  -H/O COVID-19 8/26/22  -ANXIETY/SCHIZOPHRENIA/BIZARRE BEHAVIOR  -EDEMA  -OBESITY, BMI 32    BETA-BLOCKER: PROPRANOLOL   Last dose: 0300    New Orders for Anesthesia: NONE    Patient Active Problem List   Diagnosis    Total, mature age-related cataract    Preop cardiovascular exam    Mixed hyperlipidemia    Edema    Tachycardia       Past Surgical History:   Procedure Laterality Date    CATARACT EXTRACTION W/  INTRAOCULAR LENS IMPLANT Left 9/28/2023    Procedure: EXTRACTION, CATARACT, WITH IOL INSERTION;  Surgeon: Mustapha Hernandez MD;  Location: [facility];  Service: Ophthalmology;  Laterality: Left;       Lab Results   Component Value Date    WBC 6.6 08/26/2022    WBC 7 08/26/2022    HGB 11.8 08/26/2022    HCT 36.4 08/26/2022     (L) 08/26/2022       CMP  Sodium   Date Value Ref Range Status   08/26/2022 135 (L) 136 - 145 mmol/L Final     Potassium   Date Value Ref Range Status   08/26/2022 4.0 3.5 - 5.1 mmol/L Final     CO2   Date Value Ref Range Status   08/26/2022 23 23 - 31 mmol/L Final     Blood Urea Nitrogen   Date Value Ref Range Status   08/26/2022 23.1 8.4 - 25.7 mg/dL Final     Creatinine   Date Value Ref Range Status   08/26/2022 1.14 0.73 - 1.18 mg/dL Final     Calcium   Date Value Ref Range Status   08/26/2022 9.0 8.8 - 10.0 mg/dL Final     Albumin   Date Value Ref Range Status   08/26/2022 4.0 3.4 - 4.8 gm/dL Final     Bilirubin Total   Date Value Ref Range Status    08/26/2022 0.6 <=1.5 mg/dL Final     ALP   Date Value Ref Range Status   08/26/2022 49 40 - 150 unit/L Final     AST   Date Value Ref Range Status   08/26/2022 19 5 - 34 unit/L Final     ALT   Date Value Ref Range Status   08/26/2022 15 0 - 55 unit/L Final     eGFR   Date Value Ref Range Status   08/26/2022 50 mls/min/1.73/m2 Final       Lab Results   Component Value Date    PROTIME 12.7 08/26/2022    INR 0.96 08/26/2022       Lab Results   Component Value Date    PTT 30.1 08/26/2022           CARDIAC TESTING:  Results for orders placed during the hospital encounter of 01/25/24     Echo     Interpretation Summary    Left Ventricle: The left ventricle is normal in size. Normal wall thickness. There is normal systolic function with a visually estimated ejection fraction of 60 - 65%.    Right Ventricle: Normal right ventricular cavity size. Systolic function is normal.    Aortic Valve: The aortic valve is a trileaflet valve. There is no stenosis. Aortic valve peak velocity is 0.86 m/s. Mean gradient is 2 mmHg.    Mitral Valve: The mitral valve is structurally normal.    Tricuspid Valve: The tricuspid valve is structurally normal. There is trace regurgitation.    Pulmonic Valve: There is mild regurgitation.    Pulmonary Artery: The estimated pulmonary artery systolic pressure is 26 mmHg.    IVC/SVC: Normal venous pressure at 3 mmHg.     Results for orders placed during the hospital encounter of 01/26/24     Nuclear Stress - Cardiology Interpreted     Interpretation Summary    Normal myocardial perfusion scan. There is no evidence of myocardial ischemia or infarction.    The gated perfusion images showed an ejection fraction of 81% at rest. The gated perfusion images showed an ejection fraction of 73% post stress.    The patient reported no chest pain during the stress test.     The nuclear stress test is  fair quality.  On the nuclear stress test the EF is supranormal.  If the patient has an echo, the EF on the echo  is considered more accurate.     The patient has a low risk nuclear stress test     No ischemia noted    CARDS PROGRESS NOTE 4/30/24    Past anesthesia records:       Pre-op Assessment    I have reviewed the Patient Summary Reports.     I have reviewed the Nursing Notes. I have reviewed the NPO Status.   I have reviewed the Medications.     Review of Systems  Anesthesia Hx:  No problems with previous Anesthesia   History of prior surgery of interest to airway management or planning:          Denies Family Hx of Anesthesia complications.    Denies Personal Hx of Anesthesia complications.                    Hematology/Oncology:  Hematology Normal   Oncology Normal                                   EENT/Dental:  EENT/Dental Normal           Cardiovascular:  Cardiovascular Normal                                            Pulmonary:  Pulmonary Normal                       Renal/:  Renal/ Normal                 Hepatic/GI:  Hepatic/GI Normal                 Musculoskeletal:  Musculoskeletal Normal                Neurological:  Neurology Normal                                      Endocrine:  Endocrine Normal            Dermatological:  Skin Normal    Psych:  Psychiatric Normal                    Physical Exam  General: Well nourished, Cooperative, Alert and Oriented    Airway:  Mallampati: unable to assess         Anesthesia Plan  Type of Anesthesia, risks & benefits discussed:    Anesthesia Type: Gen Supraglottic Airway  Intra-op Monitoring Plan: Standard ASA Monitors  Induction:  IV  Informed Consent: Informed consent signed with the Patient and all parties understand the risks and agree with anesthesia plan.  All questions answered. Patient consented to blood products? No  ASA Score: 4  Day of Surgery Review of History & Physical: H&P Update referred to the surgeon/provider.    Ready For Surgery From Anesthesia Perspective.     .

## 2024-05-29 NOTE — PROGRESS NOTES
HPI    Ready to schedule Cataract OD if possible  Patient was cleared by Cardiologist in April for procedure  Last edited by Tracy Heredia MA on 5/29/2024 12:05 PM.      Assessment /Plan       1. Visually significant cataract, OD  - unable to determine VA dt hx intellectual disability, schizophrenia, anxiety; but OS went from F&F to 20/80 with pictures after cataract surgery and right eye appears to be a dense white cataract.   - pt could not consent for self, [facility] [place] would need to consent for surgery; fax sent for consent form on 5/29/24  - B scan OU with flat retina  - Patient definitely with visually significant cataract, but given his severe cognitive impairment, high threshold for surgery. He would not be able to follow post op instructions and would require around the clock monitoring, especially early first week post op period  - Caregiver states that patient is having more trouble with everyday tasks due to vision decrease.   - 10/30/23: OS healed. Doing well.  - 5/29/24: Patient finally cleared for surgery by cardiology. Will schedule 6/6/24 CEIOL OD with Dr. Mcguire and Dr. Dimas. OS was performed based on measures of right AL. Likely +17.50 to aim for same post-op Rx as the left eye.    2. s/p phaco/IOL OS with manual nucleus removal  - DOS: 9/29/23  - Lens Used: iris fixated 3 piece [id number] +16.5        RTC POD1

## 2024-06-06 ENCOUNTER — HOSPITAL ENCOUNTER (OUTPATIENT)
Facility: HOSPITAL | Age: 79
Discharge: HOME | End: 2024-06-06
Attending: OPHTHALMOLOGY | Admitting: OPHTHALMOLOGY
Payer: MEDICARE

## 2024-06-06 ENCOUNTER — ANESTHESIA (OUTPATIENT)
Dept: SURGERY | Facility: HOSPITAL | Age: 79
End: 2024-06-06
Payer: MEDICARE

## 2024-06-06 VITALS
HEART RATE: 86 BPM | OXYGEN SATURATION: 97 % | DIASTOLIC BLOOD PRESSURE: 82 MMHG | SYSTOLIC BLOOD PRESSURE: 134 MMHG | WEIGHT: 179.63 LBS | TEMPERATURE: 98 F | BODY MASS INDEX: 30.83 KG/M2 | RESPIRATION RATE: 20 BRPM

## 2024-06-06 DIAGNOSIS — H25.811 COMBINED FORM OF AGE-RELATED CATARACT, RIGHT EYE: ICD-10-CM

## 2024-06-06 PROCEDURE — 71000016 HC POSTOP RECOV ADDL HR: Performed by: OPHTHALMOLOGY

## 2024-06-06 PROCEDURE — 71000015 HC POSTOP RECOV 1ST HR: Performed by: OPHTHALMOLOGY

## 2024-06-06 PROCEDURE — 25000003 PHARM REV CODE 250: Performed by: OPHTHALMOLOGY

## 2024-06-06 PROCEDURE — 25000003 PHARM REV CODE 250: Performed by: SPECIALIST

## 2024-06-06 PROCEDURE — 25000003 PHARM REV CODE 250

## 2024-06-06 PROCEDURE — 63600175 PHARM REV CODE 636 W HCPCS: Performed by: NURSE ANESTHETIST, CERTIFIED REGISTERED

## 2024-06-06 PROCEDURE — 71000033 HC RECOVERY, INTIAL HOUR: Performed by: OPHTHALMOLOGY

## 2024-06-06 PROCEDURE — 37000008 HC ANESTHESIA 1ST 15 MINUTES: Performed by: OPHTHALMOLOGY

## 2024-06-06 PROCEDURE — 25000003 PHARM REV CODE 250: Performed by: NURSE ANESTHETIST, CERTIFIED REGISTERED

## 2024-06-06 PROCEDURE — D9220A PRA ANESTHESIA: Mod: CRNA,,, | Performed by: NURSE ANESTHETIST, CERTIFIED REGISTERED

## 2024-06-06 PROCEDURE — 25000003 PHARM REV CODE 250: Performed by: STUDENT IN AN ORGANIZED HEALTH CARE EDUCATION/TRAINING PROGRAM

## 2024-06-06 PROCEDURE — 63600175 PHARM REV CODE 636 W HCPCS: Performed by: OPHTHALMOLOGY

## 2024-06-06 PROCEDURE — 36000706: Performed by: OPHTHALMOLOGY

## 2024-06-06 PROCEDURE — V2632 POST CHMBR INTRAOCULAR LENS: HCPCS | Performed by: OPHTHALMOLOGY

## 2024-06-06 PROCEDURE — D9220A PRA ANESTHESIA: Mod: ANES,,, | Performed by: SPECIALIST

## 2024-06-06 PROCEDURE — 37000009 HC ANESTHESIA EA ADD 15 MINS: Performed by: OPHTHALMOLOGY

## 2024-06-06 PROCEDURE — 36000707: Performed by: OPHTHALMOLOGY

## 2024-06-06 DEVICE — IMPLANTABLE DEVICE: Type: IMPLANTABLE DEVICE | Site: EYE | Status: FUNCTIONAL

## 2024-06-06 RX ORDER — EPINEPHRINE 1 MG/ML
INJECTION INTRAMUSCULAR; INTRAVENOUS; SUBCUTANEOUS
Status: DISCONTINUED | OUTPATIENT
Start: 2024-06-06 | End: 2024-06-06 | Stop reason: HOSPADM

## 2024-06-06 RX ORDER — CYCLOPENTOLAT/TROPIC/PHENYLEPH 1%-1%-2.5%
1 DROPS (EA) OPHTHALMIC (EYE)
Status: DISCONTINUED | OUTPATIENT
Start: 2024-06-06 | End: 2024-06-06

## 2024-06-06 RX ORDER — DIPHENHYDRAMINE HYDROCHLORIDE 50 MG/ML
25 INJECTION INTRAMUSCULAR; INTRAVENOUS ONCE AS NEEDED
Status: DISCONTINUED | OUTPATIENT
Start: 2024-06-06 | End: 2024-06-06 | Stop reason: HOSPADM

## 2024-06-06 RX ORDER — PROCHLORPERAZINE EDISYLATE 5 MG/ML
5 INJECTION INTRAMUSCULAR; INTRAVENOUS ONCE AS NEEDED
Status: DISCONTINUED | OUTPATIENT
Start: 2024-06-06 | End: 2024-06-06 | Stop reason: HOSPADM

## 2024-06-06 RX ORDER — TROPICAMIDE 5 MG/ML
1 SOLUTION/ DROPS OPHTHALMIC ONCE
Status: DISCONTINUED | OUTPATIENT
Start: 2024-06-06 | End: 2024-06-06

## 2024-06-06 RX ORDER — CYCLOPENTOLAT/TROPIC/PHENYLEPH 1%-1%-2.5%
1 DROPS (EA) OPHTHALMIC (EYE)
Status: COMPLETED | OUTPATIENT
Start: 2024-06-06 | End: 2024-06-06

## 2024-06-06 RX ORDER — OXYCODONE AND ACETAMINOPHEN 5; 325 MG/1; MG/1
2 TABLET ORAL ONCE
Status: DISCONTINUED | OUTPATIENT
Start: 2024-06-06 | End: 2024-06-06 | Stop reason: HOSPADM

## 2024-06-06 RX ORDER — MEPERIDINE HYDROCHLORIDE 25 MG/ML
12.5 INJECTION INTRAMUSCULAR; INTRAVENOUS; SUBCUTANEOUS ONCE
Status: DISCONTINUED | OUTPATIENT
Start: 2024-06-06 | End: 2024-06-06 | Stop reason: HOSPADM

## 2024-06-06 RX ORDER — LIDOCAINE HYDROCHLORIDE 20 MG/ML
INJECTION, SOLUTION EPIDURAL; INFILTRATION; INTRACAUDAL; PERINEURAL
Status: DISCONTINUED | OUTPATIENT
Start: 2024-06-06 | End: 2024-06-06

## 2024-06-06 RX ORDER — IPRATROPIUM BROMIDE AND ALBUTEROL SULFATE 2.5; .5 MG/3ML; MG/3ML
3 SOLUTION RESPIRATORY (INHALATION) ONCE AS NEEDED
Status: DISCONTINUED | OUTPATIENT
Start: 2024-06-06 | End: 2024-06-06 | Stop reason: HOSPADM

## 2024-06-06 RX ORDER — SODIUM CHLORIDE 9 MG/ML
INJECTION, SOLUTION INTRAVENOUS CONTINUOUS
Status: DISCONTINUED | OUTPATIENT
Start: 2024-06-06 | End: 2024-06-06 | Stop reason: HOSPADM

## 2024-06-06 RX ORDER — ETOMIDATE 2 MG/ML
INJECTION INTRAVENOUS
Status: DISCONTINUED | OUTPATIENT
Start: 2024-06-06 | End: 2024-06-06

## 2024-06-06 RX ORDER — HYDROMORPHONE HYDROCHLORIDE 1 MG/ML
0.2 INJECTION, SOLUTION INTRAMUSCULAR; INTRAVENOUS; SUBCUTANEOUS EVERY 5 MIN PRN
Status: DISCONTINUED | OUTPATIENT
Start: 2024-06-06 | End: 2024-06-06 | Stop reason: HOSPADM

## 2024-06-06 RX ORDER — PHENYLEPHRINE HYDROCHLORIDE 10 MG/ML
INJECTION INTRAVENOUS
Status: DISCONTINUED | OUTPATIENT
Start: 2024-06-06 | End: 2024-06-06

## 2024-06-06 RX ORDER — PROPARACAINE HYDROCHLORIDE 5 MG/ML
1 SOLUTION/ DROPS OPHTHALMIC
Status: DISCONTINUED | OUTPATIENT
Start: 2024-06-06 | End: 2024-06-06 | Stop reason: HOSPADM

## 2024-06-06 RX ORDER — FENTANYL CITRATE 50 UG/ML
INJECTION, SOLUTION INTRAMUSCULAR; INTRAVENOUS
Status: DISCONTINUED | OUTPATIENT
Start: 2024-06-06 | End: 2024-06-06

## 2024-06-06 RX ORDER — HYDROMORPHONE HYDROCHLORIDE 1 MG/ML
0.5 INJECTION, SOLUTION INTRAMUSCULAR; INTRAVENOUS; SUBCUTANEOUS EVERY 5 MIN PRN
Status: DISCONTINUED | OUTPATIENT
Start: 2024-06-06 | End: 2024-06-06 | Stop reason: HOSPADM

## 2024-06-06 RX ORDER — FLURBIPROFEN SODIUM 0.3 MG/ML
SOLUTION/ DROPS OPHTHALMIC
Status: DISCONTINUED | OUTPATIENT
Start: 2024-06-06 | End: 2024-06-06 | Stop reason: HOSPADM

## 2024-06-06 RX ORDER — LIDOCAINE HYDROCHLORIDE 10 MG/ML
1 INJECTION, SOLUTION EPIDURAL; INFILTRATION; INTRACAUDAL; PERINEURAL ONCE
Status: DISCONTINUED | OUTPATIENT
Start: 2024-06-06 | End: 2024-06-06 | Stop reason: HOSPADM

## 2024-06-06 RX ORDER — ONDANSETRON HYDROCHLORIDE 2 MG/ML
4 INJECTION, SOLUTION INTRAVENOUS ONCE
Status: DISCONTINUED | OUTPATIENT
Start: 2024-06-06 | End: 2024-06-06 | Stop reason: HOSPADM

## 2024-06-06 RX ORDER — MOXIFLOXACIN 5 MG/ML
SOLUTION/ DROPS OPHTHALMIC
Status: DISCONTINUED | OUTPATIENT
Start: 2024-06-06 | End: 2024-06-06 | Stop reason: HOSPADM

## 2024-06-06 RX ORDER — PREDNISOLONE ACETATE 10 MG/ML
SUSPENSION/ DROPS OPHTHALMIC
Status: DISCONTINUED | OUTPATIENT
Start: 2024-06-06 | End: 2024-06-06 | Stop reason: HOSPADM

## 2024-06-06 RX ADMIN — PHENYLEPHRINE HYDROCHLORIDE 100 MCG: 10 INJECTION INTRAVENOUS at 07:06

## 2024-06-06 RX ADMIN — Medication 1 DROP: at 05:06

## 2024-06-06 RX ADMIN — FENTANYL CITRATE 100 MCG: 50 INJECTION, SOLUTION INTRAMUSCULAR; INTRAVENOUS at 07:06

## 2024-06-06 RX ADMIN — PROPARACAINE HYDROCHLORIDE 1 DROP: 5 SOLUTION/ DROPS OPHTHALMIC at 05:06

## 2024-06-06 RX ADMIN — ETOMIDATE INJECTION 12 MG: 2 SOLUTION INTRAVENOUS at 07:06

## 2024-06-06 RX ADMIN — PHENYLEPHRINE HYDROCHLORIDE 200 MCG: 10 INJECTION INTRAVENOUS at 07:06

## 2024-06-06 RX ADMIN — PHENYLEPHRINE HYDROCHLORIDE 2 MCG/KG/MIN: 10 INJECTION INTRAVENOUS at 07:06

## 2024-06-06 RX ADMIN — LIDOCAINE HYDROCHLORIDE 50 MG: 20 INJECTION, SOLUTION EPIDURAL; INFILTRATION; INTRACAUDAL; PERINEURAL at 07:06

## 2024-06-06 RX ADMIN — SODIUM CHLORIDE: 9 INJECTION, SOLUTION INTRAVENOUS at 06:06

## 2024-06-06 NOTE — ANESTHESIA PROCEDURE NOTES
Intubation    Date/Time: 6/6/2024 7:11 AM    Performed by: James Lee CRNA  Authorized by: James Lee CRNA    Intubation:     Induction:  Intravenous    Intubated:  Postinduction    Mask Ventilation:  Easy mask    Attempts:  1    Attempted By:  CRNA    Difficult Airway Encountered?: No      Complications:  None    Airway Device:  Supraglottic airway/LMA    Airway Device Size:  4.0    Placement Verified By:  Capnometry    Complicating Factors:  None    Findings Post-Intubation:  BS equal bilateral

## 2024-06-06 NOTE — ANESTHESIA POSTPROCEDURE EVALUATION
Anesthesia Post Evaluation    Patient: Trae Olivo    Procedure(s) Performed: Procedure(s) (LRB):  EXTRACTION, CATARACT (Right)    Final Anesthesia Type: general      Patient location during evaluation: PACU  Patient participation: Yes- Able to Participate  Level of consciousness: awake and responds to stimulation  Post-procedure vital signs: reviewed and stable  Pain management: adequate  Airway patency: patent    PONV status at discharge: No PONV  Anesthetic complications: no      Cardiovascular status: blood pressure returned to baseline  Respiratory status: unassisted  Hydration status: euvolemic  Follow-up not needed.              Vitals Value Taken Time   /99 06/06/24 0827   Temp 36.1 °C (97 °F) 06/06/24 0822   Pulse 85 06/06/24 0835   Resp 16 06/06/24 0835   SpO2 98 % 06/06/24 0835         No case tracking events are documented in the log.      Pain/Serafin Score: Serafin Score: 8 (6/6/2024  8:36 AM)

## 2024-06-06 NOTE — DISCHARGE SUMMARY
Discharge Summary     SUMMARY     Surgery Date: 6/6/2024     Surgeons and Role:     * Mickey Mcguire MD - Primary    Pre-op Diagnosis:  * No pre-op diagnosis entered *    Post-op Diagnosis: Same    Procedure(s) (LRB):  EXTRACTION, CATARACT (Right)    Anesthesia: General    Findings/Key Components:  High grade IFIS with small pupillary dilation ~4-5mm    Estimated Blood Loss: * No values recorded between 6/6/2024  7:33 AM and 6/6/2024  8:21 AM *    Implant Name Type Inv. Item Serial No.  Lot No. LRB No. Used Action   envista IOL   [id number] [employer] [id number] Right 1 Implanted            Specimens (From admission, onward)      None              Discharge Note      SUMMARY     Admit Date: 6/6/2024    Attending Physician: Mickey Mcguire MD     Discharge Physician: Mickey Mcguire MD    Discharge Date: 6/6/2024     Final Diagnosis: Post-Op Diagnosis Codes:     * Combined form of age-related cataract, right eye [H25.811]    Hospital Course: Patient was admitted for an outpatient procedure and tolerated the procedure well with no complications.    Disposition: Home or Self Care    Patient Instructions:   Current Discharge Medication List        CONTINUE these medications which have NOT CHANGED    Details   ammonium lactate (LAC-HYDRIN) 12 % lotion 1 application to affected area Externally Twice a day      calcium carbonate (OS-KESHAWN) 600 mg calcium (1,500 mg) Tab Take 1,200 mg by mouth.      chlorproMAZINE (THORAZINE) 200 MG tablet Take 200 mg by mouth nightly.      !! LORazepam (ATIVAN) 0.5 MG tablet Take 0.5 mg by mouth 2 (two) times daily.      polyethylene glycol (GLYCOLAX) 17 gram/dose powder Take 17 g by mouth.      pravastatin (PRAVACHOL) 20 MG tablet Take 20 mg by mouth once daily.      propranoloL (INDERAL) 10 MG tablet Take 10 mg by mouth 2 (two) times daily.      acetaminophen (TYLENOL) 500 MG tablet Take 1,000 mg by mouth every 6 (six) hours as needed for Pain.      ciclopirox 0.77  % Gel 1 application to affected area Externally Twice a day      furosemide (LASIX) 20 MG tablet Take 20 mg by mouth once daily.      !! LORazepam (ATIVAN) 2 MG Tab Take 2 mg by mouth nightly as needed.      ondansetron (ZOFRAN) 4 MG tablet Take 4 mg by mouth every 4 (four) hours as needed.      PAXLOVID 300 mg (150 mg x 2)-100 mg copackaged tablets (EUA) Take by mouth.      zolpidem (AMBIEN) 5 MG Tab Take 5 mg by mouth nightly as needed.       !! - Potential duplicate medications found. Please discuss with provider.          Discharge Procedure Orders (must include Diet, Follow-up, Activity)   Discharge Procedure Orders (must include Diet, Follow-up, Activity)   Diet general          Drops: (apply 5min apart from one another to surgical eye, no consecutive application)  - Flurbiprofen QID  - Moxifloxacin QID  - Pred Forte QID (shake before taking)  Wear Eye shield at night  Return to clinic tomorrow AM for POD1 follow up.     lower

## 2024-06-06 NOTE — DISCHARGE INSTRUCTIONS
· Keep follow up appointment tomorrow __June 7th @ 10:30____at the [facility].     +++Bring drops with you to clinic     ` Resume home medications unless otherwise instructed by your doctor.    · No bending, lifting, stooping or straining until cleared by MD.    · Keep patch on until follow up appointment and while asleep at home to protect your eye.    · May take Tylenol or Ibuprofen for pain or discomfort if no allergies or contraindications.      ` No driving or consuming alcohol for 24 hours after anesthesia.    · Notify MD if you experience:    · Pain that is unrelieved by over the counter medicines    · if you feel increased pressure in your eye or sharp pain in the eye    · you have excessive, colored, or thick drainage coming from eye    · you see curtain-like darkening in the eye, flashes of light, or other sudden vision changes    · if you have any nausea or vomiting    · fever above 100.4F    · The clinics number is [phone]. If it is after business hours or emergency call [phone] and ask to speak with the eye surgeon on call..  Thanks for choosing [facility]! Have a smooth recovery!

## 2024-06-06 NOTE — H&P
Pre-Operative History & Physical  Ophthalmology      SUBJECTIVE:     History of Present Illness:  Patient is a 78 y.o. male presents with Combined form of age-related cataract, right eye [H25.811].    MEDICATIONS:   Facility-Administered Medications Prior to Admission   Medication    sodium chloride 0.9% flush 10 mL    sodium chloride 0.9% flush 10 mL    sodium chloride 0.9% flush 10 mL     PTA Medications   Medication Sig    ammonium lactate (LAC-HYDRIN) 12 % lotion 1 application to affected area Externally Twice a day    calcium carbonate (OS-KESHAWN) 600 mg calcium (1,500 mg) Tab Take 1,200 mg by mouth.    chlorproMAZINE (THORAZINE) 200 MG tablet Take 200 mg by mouth nightly.    LORazepam (ATIVAN) 0.5 MG tablet Take 0.5 mg by mouth 2 (two) times daily.    polyethylene glycol (GLYCOLAX) 17 gram/dose powder Take 17 g by mouth.    pravastatin (PRAVACHOL) 20 MG tablet Take 20 mg by mouth once daily.    propranoloL (INDERAL) 10 MG tablet Take 10 mg by mouth 2 (two) times daily.    acetaminophen (TYLENOL) 500 MG tablet Take 1,000 mg by mouth every 6 (six) hours as needed for Pain.    ciclopirox 0.77 % Gel 1 application to affected area Externally Twice a day    furosemide (LASIX) 20 MG tablet Take 20 mg by mouth once daily.    LORazepam (ATIVAN) 2 MG Tab Take 2 mg by mouth nightly as needed.    ondansetron (ZOFRAN) 4 MG tablet Take 4 mg by mouth every 4 (four) hours as needed.    PAXLOVID 300 mg (150 mg x 2)-100 mg copackaged tablets (EUA) Take by mouth.    zolpidem (AMBIEN) 5 MG Tab Take 5 mg by mouth nightly as needed.       ALLERGIES: Review of patient's allergies indicates:  No Known Allergies    PAST MEDICAL HISTORY:   Past Medical History:   Diagnosis Date    Bipolar disorder, unspecified     Cataract     Hypertension     Insomnia     Supraventricular tachycardia      PAST SURGICAL HISTORY:   Past Surgical History:   Procedure Laterality Date    CATARACT EXTRACTION W/  INTRAOCULAR LENS IMPLANT Left 9/28/2023     Procedure: EXTRACTION, CATARACT, WITH IOL INSERTION;  Surgeon: Mustapha Hernandez MD;  Location: [facility];  Service: Ophthalmology;  Laterality: Left;     PAST FAMILY HISTORY: No family history on file.  SOCIAL HISTORY:   Social History     Tobacco Use    Smoking status: Never     Passive exposure: Never    Smokeless tobacco: Never   Substance Use Topics    Alcohol use: Never    Drug use: Never        MENTAL STATUS: Alert    REVIEW OF SYSTEMS: Negative    OBJECTIVE:     Vital Signs (Most Recent)  Temp: 97.9 °F (36.6 °C) (06/06/24 0511)  Pulse: 85 (06/06/24 0511)  Resp: 18 (06/06/24 0511)  BP: (!) 141/85 (06/06/24 0535)  SpO2: 99 % (06/06/24 0511)    Physical Exam:  General: NAD  HEENT: cataract right eye  Lungs: Adequate respirations, symmetrical movements, non-labored  Heart: Intact distal pulses  Abdomen: Soft, nondistended, nontender    ASSESSMENT/PLAN:     Patient is a 78 y.o. male with Combined form of age-related cataract, right eye [H25.811].    - Plan for surgical correction with CEIOL OD.  - Allergies reviewed: Review of patient's allergies indicates:  No Known Allergies  - Risks/benefits/alternatives of the procedure including, but not limited to scarring, bleeding, infection, loss or decreased vision, and/or need for possible repeat surgery discussed with the patient and family.  - Informed consent obtained prior to surgery and the patient/family voiced good understanding.  CEIOL OD:  - +17.50 MX60E, backup 17.0 MA60AC.  - Dense white cataract, trypan blue, possible decompression.        Tyrese Dimas MD  [facility], PGY4

## 2024-06-06 NOTE — TRANSFER OF CARE
Anesthesia Transfer of Care Note    Patient: Trae Olivo    Procedure(s) Performed: Procedure(s) (LRB):  EXTRACTION, CATARACT (Right)    Patient location: PACU    Anesthesia Type: general    Transport from OR: Transported from OR on room air with adequate spontaneous ventilation    Post pain: adequate analgesia    Post assessment: no apparent anesthetic complications    Post vital signs: stable    Level of consciousness: awake    Nausea/Vomiting: no nausea/vomiting    Complications: none    Transfer of care protocol was followed      Last vitals: Visit Vitals  BP (!) 127/99   Pulse 85   Temp 36.1 °C (97 °F) (Temporal)   Resp 16   Wt 81.5 kg (179 lb 9.6 oz)   SpO2 98%   BMI 30.83 kg/m²

## 2024-06-06 NOTE — OP NOTE
Operative Note  Ophthalmology Service    Date of Procedure:  6/6/2024    Surgeon:  Tyrese Dimas MD    Staff Physician: Mcikey Mcguire MD    Pre-Operative Diagnosis: Cataract OD    Post-Operative Diagnosis: Same as pre-operative diagnosis    Treatments/Procedures Performed:   1. Cataract extraction with phacoemulsification and posterior chamber intraocular lens placement OD    Intraoperative Findings: Cataract    Anesthesia: General Anesthesia    Complications: None    Estimated Blood Loss: None    Implant:    Implant Name Type Inv. Item Serial No.  Lot No. LRB No. Used Action   envista IOL   [id number] [employer] [id number] Right 1 Implanted   +17.5 MX60E    Indication for Procedure:  The patient had a history of painless progressive vision loss interfering with activities of daily living.  Risks, benefits, alternatives, and complications were discussed thoroughly with the patient. After the opportunity to ask questions, the patient expressed understanding and a desire to proceed with the procedure. Informed consent was obtained, signed, and witnessed, and placed in the chart prior.     Procedure in detail:   The patient was brought to the operating room and placed in supine position.  A time out was performed including patient's name, date of birth, anticipated procedure, surgical site location, and allergies.  After adequate anesthesia was achieved, the patient was prepped and draped in sterile fashion using topical Betadine.     A sideport blade was used to make a paracentesis wound. Trypan Blue was injected into the anterior chamber, followed by Viscoat. A 2.4 mm keratome blade was then used to make a clear corneal triplanar wound. A cystotome was used to make a tear in the anterior capsular flap, which was continued around with Utrata forceps to complete a continuous curvilinear capsulorhexis. BSS was then used for hydrodissection and hydrodelineation. It was noticed the iris was very floppy with  egression towards the wounds for the remainder of the case. The lens was noted to spin freely within the capsular bag. The lens was then removed in a [employer] manner with the phacoemulsification handpiece. Irrigation and aspiration handpiece was then used to remove the remaining cortical material. Provisc was then used to fill the capsular bag and the lens as mentioned above was placed in the bag. The I&A was used to remove the remaining Provisc, and the wounds were hydrated with BSS. The wounds were noted to be watertight. The eye was noted to have a good physiological pressure.     The lid speculum was removed under direct visualization. Topical Vigamox, Pred-Forte, and Flurbiprofen eye drops were placed in the eye. The eye was then covered with a shield and patch. The patient tolerated the procedure well without complications. The patient was brought to the recovery room in stable condition. The patient was given instructions regarding postoperative care and the importance of follow-up.

## 2024-06-07 ENCOUNTER — OFFICE VISIT (OUTPATIENT)
Dept: OPHTHALMOLOGY | Facility: CLINIC | Age: 79
End: 2024-06-07
Payer: MEDICARE

## 2024-06-07 VITALS — BODY MASS INDEX: 30.86 KG/M2 | HEIGHT: 64 IN | WEIGHT: 180.75 LBS

## 2024-06-07 DIAGNOSIS — H25.811 COMBINED FORM OF AGE-RELATED CATARACT, RIGHT EYE: Primary | ICD-10-CM

## 2024-06-07 PROCEDURE — 99214 OFFICE O/P EST MOD 30 MIN: CPT | Mod: PBBFAC,PN | Performed by: STUDENT IN AN ORGANIZED HEALTH CARE EDUCATION/TRAINING PROGRAM

## 2024-06-07 NOTE — PROGRESS NOTES
HPI    POD 1 - PHACOEMULSIFICATION, CATARACT, WITH IOL INSERTION (Right: Eye)  Last edited by Miriam Rivas RN on 6/7/2024  8:20 AM.      Assessment /Plan         1. Pseudophakia, Right eye  - s/p phaco/IOL OD (DOS: 6/6/24; trypan blue, high CDE)  - POD #1: Shield removed in office, patient doing well. IOP wnl, wound Lara neg, IOL in place. Cornea edematous from higher CDE needed for dense lens  - Drops:   - Vigamox QID   - Pred Forte QID   - Flurbiprofen QID  - Wear eye shield when sleeping/QHS for the next week  - Wear protective glasses during the day at all times  - No bending, lifting, stooping, straining or eye rubbing  - Endophthalmitis and RD precautions reviewed      RTC 1 week for POW1 CEIOL, sooner PRN      2. s/p phaco/IOL OS with manual nucleus removal  - DOS: 9/29/23  - Lens Used: iris fixated 3 piece MA60AC +16.5      RTC POW1

## 2024-06-13 ENCOUNTER — OFFICE VISIT (OUTPATIENT)
Dept: OPHTHALMOLOGY | Facility: CLINIC | Age: 79
End: 2024-06-13
Payer: MEDICARE

## 2024-06-13 VITALS — HEIGHT: 64 IN | WEIGHT: 180.75 LBS | BODY MASS INDEX: 30.86 KG/M2

## 2024-06-13 DIAGNOSIS — Z98.890 POSTOPERATIVE EYE STATE: Primary | ICD-10-CM

## 2024-06-13 PROCEDURE — 99213 OFFICE O/P EST LOW 20 MIN: CPT | Mod: PBBFAC,PN

## 2024-06-13 NOTE — PROGRESS NOTES
HPI    POW 1 - PHACOEMULSIFICATION, CATARACT, WITH IOL INSERTION (Right: Eye)  Last edited by Miriam Rivas RN on 6/13/2024 10:45 AM.        Assessment /Plan     1. Pseudophakia, Right eye  - s/p phaco/IOL OD (DOS: 6/6/24; trypan blue, high CDE)  - POD #1: Shield removed in office, patient doing well. IOP wnl, wound Lara neg, IOL in place. Cornea edematous from higher CDE needed for dense lens  - POW#1: Doing well 6/13/24, [facility], exam: AC with 1+ cell  - Stop vigamox. Can use ketorolac until finished. Taper PF (TID 1wk, BID 1wk, daily 1wk, then stop)  - Endophthalmitis and RD precautions reviewed    2. s/p phaco/IOL OS with manual nucleus removal  - DOS: 9/29/23  - Lens Used: iris fixated 3 piece MA60AC +16.5      RTC POM1 as scheduled (dilate first, then retinoscopy)

## 2024-07-09 ENCOUNTER — OFFICE VISIT (OUTPATIENT)
Dept: OPHTHALMOLOGY | Facility: CLINIC | Age: 79
End: 2024-07-09
Payer: MEDICARE

## 2024-07-09 VITALS — HEIGHT: 64 IN | WEIGHT: 180.75 LBS | BODY MASS INDEX: 30.86 KG/M2

## 2024-07-09 DIAGNOSIS — Z98.890 POSTOPERATIVE EYE STATE: Primary | ICD-10-CM

## 2024-07-09 PROCEDURE — 99213 OFFICE O/P EST LOW 20 MIN: CPT | Mod: PBBFAC,PN

## 2024-07-09 NOTE — PROGRESS NOTES
HPI    POM 1- PHACOEMULSIFICATION, CATARACT, WITH IOL INSERTION (Right: Eye)  Last edited by Miriam Rivas RN on 7/9/2024  2:34 PM.        Assessment /Plan     1. Pseudophakia, Right eye  - s/p phaco/IOL OD (DOS: 6/6/24; trypan blue, high CDE)  - POD #1: Shield removed in office, patient doing well. IOP wnl, wound Lara neg, IOL in place. Cornea edematous from higher CDE needed for dense lens  - POW#1: Doing well 6/13/24, [facility], exam: AC with 1+ cell  - Stop vigamox. Can use ketorolac until finished. Taper PF (TID 1wk, BID 1wk, daily 1wk, then stop)  - Endophthalmitis and RD precautions reviewed  - POM#1: Doing well. [facility] FF, AC with trace cell. Off drops. Attempted Retinoscopy. Will leave plano at this time. Recommend +2.50 readers.      2. s/p phaco/IOL OS with manual nucleus removal  - DOS: 9/29/23  - Lens Used: iris fixated 3 piece MA60AC +16.5      RTC 3 months for AC Check

## 2024-10-10 ENCOUNTER — OFFICE VISIT (OUTPATIENT)
Dept: OPHTHALMOLOGY | Facility: CLINIC | Age: 79
End: 2024-10-10
Payer: MEDICARE

## 2024-10-10 VITALS — HEIGHT: 64 IN | BODY MASS INDEX: 30.86 KG/M2 | WEIGHT: 180.75 LBS

## 2024-10-10 DIAGNOSIS — H26.491 RIGHT POSTERIOR CAPSULAR OPACIFICATION: ICD-10-CM

## 2024-10-10 DIAGNOSIS — H20.9 IRITIS: Primary | ICD-10-CM

## 2024-10-10 PROCEDURE — 99213 OFFICE O/P EST LOW 20 MIN: CPT | Mod: PBBFAC,PN

## 2024-10-10 NOTE — PROGRESS NOTES
HPI     Follow-up     Additional comments: s/p phaco/IOL OS with manual nucleus removal  - DOS: 9/29/23             Comments    3 months for AC Check           Last edited by Tracy Heredia MA on 10/10/2024 10:58 AM.            Assessment /Plan     For exam results, see Encounter Report.    There are no diagnoses linked to this encounter.      1. Pseudophakia, Right eye  - s/p phaco/IOL OD (DOS: 6/6/24; trypan blue, high CDE)  - POD #1: Shield removed in office, patient doing well. IOP wnl, wound Lara neg, IOL in place. Cornea edematous from higher CDE needed for dense lens  - POW#1: Doing well 6/13/24, [facility], exam: AC with 1+ cell  - Stop vigamox. Can use ketorolac until finished. Taper PF (TID 1wk, BID 1wk, daily 1wk, then stop)  - Endophthalmitis and RD precautions reviewed  - POM#1: Doing well. [facility] FF, AC with trace cell. Off drops. Attempted Retinoscopy. Will leave plano at this time. Recommend +2.50 readers.  - 10/10/24: No AC reaction remaining. Vision and IOP ok. Follow up 1 year, sooner prn.       2. s/p phaco/IOL OS with manual nucleus removal  - DOS: 9/29/23  - Lens Used: iris fixated 3 piece MA60AC +16.5      RTC 1 year DFE

## (undated) DEVICE — Device

## (undated) DEVICE — GLOVE SIGNATURE MICRO LTX 6

## (undated) DEVICE — NDL FLTR 5MCRN BLNT TIP 18GX1

## (undated) DEVICE — BETADINE OPTHALMIC SOL 5% 30ML

## (undated) DEVICE — GLOVE SENSICARE PI GRN 7

## (undated) DEVICE — KNIFE SURG LASEREDGE + 1.1MM

## (undated) DEVICE — PACK FLUIDICS ADAPTIVE BASIC

## (undated) DEVICE — HANDPIECE OPTH 1.8MM

## (undated) DEVICE — SYR TB 1ML LL 27GX1/2

## (undated) DEVICE — SUT ETHILON 10/0 CS160-6

## (undated) DEVICE — GLOVE SIGNATURE MICRO LTX 8

## (undated) DEVICE — PROTECTOR ONE-STEP ARM REG

## (undated) DEVICE — SYR 3CC LUER LOC

## (undated) DEVICE — DRESSING TRANS 2X2 TEGADERM

## (undated) DEVICE — KNIFE ANGLE 1MM

## (undated) DEVICE — TAPE CURAD PAPER ADH 1INX10YD

## (undated) DEVICE — GLOVE SIGNATURE MICRO LTX 7

## (undated) DEVICE — NDL MAGELLAN SAFETY 18G 1.5IN

## (undated) DEVICE — GLOVE SENSICARE PI GRN 6

## (undated) DEVICE — SLEEVE OPTH 2.4MM

## (undated) DEVICE — BLADE SURG BVL ANG COAX 2.4MM

## (undated) DEVICE — PACK CATARACT BAUSCH AND LOMB

## (undated) DEVICE — SUT 8-0 8 COATED VICRYL VI

## (undated) DEVICE — 2.5MM SLIT OPHTH KNIFE

## (undated) DEVICE — GOWN POLY REINF BRTH SLV XL

## (undated) DEVICE — TIP I & A